# Patient Record
Sex: FEMALE | Employment: OTHER | URBAN - METROPOLITAN AREA
[De-identification: names, ages, dates, MRNs, and addresses within clinical notes are randomized per-mention and may not be internally consistent; named-entity substitution may affect disease eponyms.]

---

## 2024-08-27 ENCOUNTER — HOSPITAL ENCOUNTER (INPATIENT)
Age: 64
DRG: 949 | End: 2024-08-27
Attending: STUDENT IN AN ORGANIZED HEALTH CARE EDUCATION/TRAINING PROGRAM | Admitting: STUDENT IN AN ORGANIZED HEALTH CARE EDUCATION/TRAINING PROGRAM
Payer: COMMERCIAL

## 2024-08-27 PROBLEM — S06.5X9A TRAUMATIC SUBDURAL HEMORRHAGE WITH LOSS OF CONSCIOUSNESS OF UNSPECIFIED DURATION, INITIAL ENCOUNTER (HCC): Status: ACTIVE | Noted: 2024-08-27

## 2024-08-27 PROCEDURE — 1280000000 HC REHAB R&B

## 2024-08-27 PROCEDURE — 6370000000 HC RX 637 (ALT 250 FOR IP): Performed by: STUDENT IN AN ORGANIZED HEALTH CARE EDUCATION/TRAINING PROGRAM

## 2024-08-27 RX ORDER — ACETAMINOPHEN 325 MG/1
650 TABLET ORAL EVERY 4 HOURS PRN
Status: DISPENSED | OUTPATIENT
Start: 2024-08-27

## 2024-08-27 RX ORDER — LEVETIRACETAM 500 MG/1
1500 TABLET ORAL 2 TIMES DAILY
Status: DISCONTINUED | OUTPATIENT
Start: 2024-08-27 | End: 2024-08-30

## 2024-08-27 RX ORDER — SODIUM CHLORIDE 1 G/1
2 TABLET ORAL
Status: DISPENSED | OUTPATIENT
Start: 2024-08-28

## 2024-08-27 RX ORDER — DIPHENHYDRAMINE HYDROCHLORIDE, ZINC ACETATE 2; .1 G/100G; G/100G
CREAM TOPICAL 3 TIMES DAILY PRN
Status: DISCONTINUED | OUTPATIENT
Start: 2024-08-27 | End: 2024-08-29

## 2024-08-27 RX ORDER — TRAZODONE HYDROCHLORIDE 50 MG/1
50 TABLET, FILM COATED ORAL NIGHTLY PRN
Status: DISPENSED | OUTPATIENT
Start: 2024-08-27

## 2024-08-27 RX ORDER — SENNOSIDES A AND B 8.6 MG/1
1 TABLET, FILM COATED ORAL DAILY PRN
Status: ACTIVE | OUTPATIENT
Start: 2024-08-27

## 2024-08-27 RX ORDER — POLYETHYLENE GLYCOL 3350 17 G/17G
17 POWDER, FOR SOLUTION ORAL DAILY PRN
Status: ACTIVE | OUTPATIENT
Start: 2024-08-27

## 2024-08-27 RX ORDER — ENOXAPARIN SODIUM 100 MG/ML
40 INJECTION SUBCUTANEOUS DAILY
Status: DISPENSED | OUTPATIENT
Start: 2024-08-28

## 2024-08-27 RX ORDER — MULTIVITAMIN WITH IRON
1 TABLET ORAL DAILY
Status: DISPENSED | OUTPATIENT
Start: 2024-08-28

## 2024-08-27 RX ADMIN — DIPHENHYDRAMINE HYDROCHLORIDE, ZINC ACETATE: 2; .1 CREAM TOPICAL at 23:31

## 2024-08-27 RX ADMIN — TRAZODONE HYDROCHLORIDE 50 MG: 50 TABLET ORAL at 21:29

## 2024-08-27 RX ADMIN — ACETAMINOPHEN 650 MG: 325 TABLET ORAL at 21:28

## 2024-08-27 RX ADMIN — LEVETIRACETAM 1500 MG: 500 TABLET, FILM COATED ORAL at 21:29

## 2024-08-27 ASSESSMENT — PAIN SCALES - GENERAL
PAINLEVEL_OUTOF10: 3
PAINLEVEL_OUTOF10: 0

## 2024-08-27 ASSESSMENT — PAIN - FUNCTIONAL ASSESSMENT: PAIN_FUNCTIONAL_ASSESSMENT: ACTIVITIES ARE NOT PREVENTED

## 2024-08-27 ASSESSMENT — PAIN DESCRIPTION - ORIENTATION: ORIENTATION: ANTERIOR

## 2024-08-27 ASSESSMENT — PAIN DESCRIPTION - LOCATION: LOCATION: BACK

## 2024-08-27 ASSESSMENT — PAIN SCALES - WONG BAKER: WONGBAKER_NUMERICALRESPONSE: NO HURT

## 2024-08-28 LAB
BACTERIA URNS QL MICRO: ABNORMAL /HPF
BILIRUB UR QL STRIP.AUTO: NEGATIVE
CLARITY UR: CLEAR
COLOR UR: YELLOW
EPI CELLS #/AREA URNS AUTO: 5 /HPF (ref 0–5)
GLUCOSE UR STRIP.AUTO-MCNC: NEGATIVE MG/DL
HGB UR QL STRIP.AUTO: NEGATIVE
HYALINE CASTS #/AREA URNS AUTO: 0 /LPF (ref 0–8)
KETONES UR STRIP.AUTO-MCNC: NEGATIVE MG/DL
LEUKOCYTE ESTERASE UR QL STRIP.AUTO: ABNORMAL
NITRITE UR QL STRIP.AUTO: NEGATIVE
PH UR STRIP.AUTO: 5.5 [PH] (ref 5–8)
PROT UR STRIP.AUTO-MCNC: NEGATIVE MG/DL
RBC CLUMPS #/AREA URNS AUTO: 1 /HPF (ref 0–4)
SP GR UR STRIP.AUTO: 1.01 (ref 1–1.03)
UA COMPLETE W REFLEX CULTURE PNL UR: ABNORMAL
UA DIPSTICK W REFLEX MICRO PNL UR: YES
URN SPEC COLLECT METH UR: ABNORMAL
UROBILINOGEN UR STRIP-ACNC: 1 E.U./DL
WBC #/AREA URNS AUTO: 7 /HPF (ref 0–5)

## 2024-08-28 PROCEDURE — 97110 THERAPEUTIC EXERCISES: CPT

## 2024-08-28 PROCEDURE — 97530 THERAPEUTIC ACTIVITIES: CPT

## 2024-08-28 PROCEDURE — 97535 SELF CARE MNGMENT TRAINING: CPT

## 2024-08-28 PROCEDURE — 6360000002 HC RX W HCPCS: Performed by: STUDENT IN AN ORGANIZED HEALTH CARE EDUCATION/TRAINING PROGRAM

## 2024-08-28 PROCEDURE — 97166 OT EVAL MOD COMPLEX 45 MIN: CPT

## 2024-08-28 PROCEDURE — 92610 EVALUATE SWALLOWING FUNCTION: CPT

## 2024-08-28 PROCEDURE — 6370000000 HC RX 637 (ALT 250 FOR IP): Performed by: STUDENT IN AN ORGANIZED HEALTH CARE EDUCATION/TRAINING PROGRAM

## 2024-08-28 PROCEDURE — 97162 PT EVAL MOD COMPLEX 30 MIN: CPT

## 2024-08-28 PROCEDURE — 81001 URINALYSIS AUTO W/SCOPE: CPT

## 2024-08-28 PROCEDURE — 97116 GAIT TRAINING THERAPY: CPT

## 2024-08-28 PROCEDURE — 1280000000 HC REHAB R&B

## 2024-08-28 PROCEDURE — 92523 SPEECH SOUND LANG COMPREHEN: CPT

## 2024-08-28 RX ORDER — TRAZODONE HYDROCHLORIDE 50 MG/1
50 TABLET, FILM COATED ORAL NIGHTLY
Status: ON HOLD | COMMUNITY

## 2024-08-28 RX ADMIN — ACETAMINOPHEN 650 MG: 325 TABLET ORAL at 01:35

## 2024-08-28 RX ADMIN — DIPHENHYDRAMINE HYDROCHLORIDE, ZINC ACETATE: 2; .1 CREAM TOPICAL at 09:38

## 2024-08-28 RX ADMIN — ACETAMINOPHEN 650 MG: 325 TABLET ORAL at 14:33

## 2024-08-28 RX ADMIN — DIPHENHYDRAMINE HYDROCHLORIDE, ZINC ACETATE: 2; .1 CREAM TOPICAL at 22:27

## 2024-08-28 RX ADMIN — LEVETIRACETAM 1500 MG: 500 TABLET, FILM COATED ORAL at 20:12

## 2024-08-28 RX ADMIN — SODIUM CHLORIDE 2 G: 1 TABLET ORAL at 09:39

## 2024-08-28 RX ADMIN — SODIUM CHLORIDE 2 G: 1 TABLET ORAL at 12:27

## 2024-08-28 RX ADMIN — TRAZODONE HYDROCHLORIDE 50 MG: 50 TABLET ORAL at 22:58

## 2024-08-28 RX ADMIN — SODIUM CHLORIDE 2 G: 1 TABLET ORAL at 17:57

## 2024-08-28 RX ADMIN — ACETAMINOPHEN 650 MG: 325 TABLET ORAL at 09:46

## 2024-08-28 RX ADMIN — THERA TABS 1 TABLET: TAB at 09:40

## 2024-08-28 RX ADMIN — ENOXAPARIN SODIUM 40 MG: 100 INJECTION SUBCUTANEOUS at 12:25

## 2024-08-28 RX ADMIN — LEVETIRACETAM 1500 MG: 500 TABLET, FILM COATED ORAL at 09:39

## 2024-08-28 ASSESSMENT — PAIN SCALES - GENERAL
PAINLEVEL_OUTOF10: 3
PAINLEVEL_OUTOF10: 0
PAINLEVEL_OUTOF10: 5

## 2024-08-28 ASSESSMENT — PAIN - FUNCTIONAL ASSESSMENT: PAIN_FUNCTIONAL_ASSESSMENT: ACTIVITIES ARE NOT PREVENTED

## 2024-08-28 ASSESSMENT — PAIN DESCRIPTION - ORIENTATION: ORIENTATION: LEFT;UPPER

## 2024-08-28 ASSESSMENT — PAIN DESCRIPTION - DESCRIPTORS
DESCRIPTORS: ACHING
DESCRIPTORS: ACHING

## 2024-08-28 ASSESSMENT — PAIN DESCRIPTION - LOCATION
LOCATION: HEAD
LOCATION: HEAD

## 2024-08-28 ASSESSMENT — PAIN SCALES - WONG BAKER: WONGBAKER_NUMERICALRESPONSE: NO HURT

## 2024-08-28 NOTE — PROGRESS NOTES
Patient was admitted to room 4907 at 1930.  Patient was oriented to the Call Light, Phone, TV, Thermostat, Bed Controls, Bathroom and Emergency Cord.  Patient verbalized and demonstrated understanding of all.  Patient was also given an overview of Unit Routines for Acute Rehab, including the patient's rights and responsibilities as well as the CMS IRF DAISY Privacy Act Statement providing notice of data collection.  Patient states that their normal bowel regime is daily. Meal times were explained, including how to order food.  The white board, (which is posted on the wall by the door is used for communication) has the Therapy Scheduled that is posted each day along with the name of your doctor, nurse, and therapist for your convenience.  We recommend any family that will be care givers or any care givers the patient has, take part in therapy.  We have no set visiting hours, we suggest non-caregiver friends and family visitors come after therapy (at 4 PM or later) to allow patient to rest in between sessions.      In conjunction with the patient and patient’s family, this nurse worked to establish a tailored Fall TIPS plan to ensure patient safety and compliance:    Falls TIPS Completion    Patient identified as increased risk for harm if fall:  [x] Yes     Fall Risks  History of Falls:    [x] Yes   Medication Side Effects:   [] Yes   Walking Aid:    [] Yes   IV Pole or Equipment:   [] Yes   Unsteady Walk:     [] Yes   May Forget or Choose Not to Call: [x] Yes     Fall Interventions   Communicate Recent Fall and/or Risk of Harm: [x] Yes   Walking Aids:  Crutches: [] Yes   Cane: [] Yes   Walker: [] Yes   IV Assistance When Walking: [x] Yes   Toileting Schedule: Every 2 Hours  Bedpan:   [] Yes   Assist to Commode: [] Yes   Assist to Bathroom: [x] Yes   Bed Alarm On: [] Yes   Assistance Out of Bed:  Bedrest: [] Yes   1 Person: [x] Yes   2 People: [] Yes

## 2024-08-28 NOTE — PROGRESS NOTES
Patient transported from . Fell down 6 steps at home after drinking. Patient sustained multifocal intracranial hemorrhagic contusions predominantly involving right and left frontal lobes. Nondisplaced left occipital bone fracture extending to left occipital condyle. Scattered multifocal subarachnoid hemorrhage and a 2mm midline shift. While at  patient was in the ICU, is reported to have been in ETOH withdraw and at some point required soft restraints. Patient completed CIWA protocol prior to discharge.Since arriving at ARU patient is ambulating with a walker and gate belt. Swallows pills whole with water. Skin is intact, however patient has a rash that covers her entire back extending down to the buttocks,and the flank. Patient has urinary urgency, is alert to person, and place. Patient is pleasant but confused. Patient is highly impulsive and non compliant with use of the call light and is very forgetful.   Due to impulsivity initiated  at 2330.  Patient stated she has some burning on urination put hat in toilet.

## 2024-08-28 NOTE — PROGRESS NOTES
Nutrition Note    RECOMMENDATIONS  PO Diet: Continue the current diet   ONS: Continue the current ONS    ASSESSMENT   Admission nutrition assessment. Pt was transferred from , noted that she has been in ETOH withdraw at . Upon visiting, family reported pt has been not eating well at home, UBW is 123lb. CBW is 119 lb which is likely transfered from previous hospital record, insufficient wt data for wt hx review. Pt is currently on a regular diet; she is tolerating diet well. She also has Ensure plus HP ordered TID. No improvement of appetite at this visit. Pt took 25-50% of her breakfast today. She accepted ONS well, 100% per pt. Recommended pt to use ONS after or between meals for additional nutrition. Pt and family are understanding. Will continue to follow up.       Malnutrition Status: At risk for malnutrition (Comment)  Social/Environmental Circumstances  Findings of the 6 clinical characteristics of malnutrition:  Energy Intake:  50% or less estimated energy requirements for 1 month or longer  Weight Loss:  Unable to assess     Body Fat Loss:  No significant body fat loss     Muscle Mass Loss:  No significant muscle mass loss    Fluid Accumulation:  No significant fluid accumulation     Strength:  Not Performed      NUTRITION DIAGNOSIS   Inadequate protein-energy intake related to inadequate protein-energy intake as evidenced by poor intake prior to admission, intake 26-50%    Goals: PO intake 50% or greater, by next RD assessment     NUTRITION RELATED FINDINGS  Objective: NO edema noted. No BM noted.  Wounds: None    CURRENT NUTRITION THERAPIES  ADULT ORAL NUTRITION SUPPLEMENT; Breakfast, Lunch, Dinner; Standard High Calorie/High Protein Oral Supplement  ADULT DIET; Regular; No Caffeine     PO Intake: 26-50%   PO Supplement Intake:%      ANTHROPOMETRICS  Current Height: 160 cm (5' 2.99\")  Current Weight - Scale: 54 kg (119 lb)    Ideal Body Weight (IBW): 115 lbs  (52 kg)    Usual Bodyweight 55.8

## 2024-08-28 NOTE — PROGRESS NOTES
The pt's  in the room since 2pm.  Stated will be with the pt until 6pm.  Discussed the fall risk with the .  The  verbalized understanding.  Camera in place for monitoring.  Removed a sitter from the room while the  there.

## 2024-08-28 NOTE — PROGRESS NOTES
4 Eyes Skin Assessment     NAME:  Meche Bansal  YOB: 1960  MEDICAL RECORD NUMBER:  [id number]    The patient is being assessed for  Admission    I agree that at least one RN has performed a thorough Head to Toe Skin Assessment on the patient. ALL assessment sites listed below have been assessed.      Areas assessed by both nurses:    Head, Face, Ears, Shoulders, Back, Chest, Arms, Elbows, Hands, Sacrum. Buttock, Coccyx, Ischium, Legs. Feet and Heels, and Under Medical Devices        Does the Patient have a Wound? No noted wound(s)  Patient presented with: scattered bruising; a red rash on back, buttocks, coccyx, and trunk; and scab on left anterior abarca.       David Prevention initiated by RN: No  Wound Care Orders initiated by RN: No    Pressure Injury (Stage 3,4, Unstageable, DTI, NWPT, and Complex wounds) if present, place Wound referral order by RN under : No    New Ostomies, if present place, Ostomy referral order under : No     Nurse 1 eSignature: Electronically signed by Kenney Martin RN on 8/28/24 at 2:11 AM EDT    **SHARE this note so that the co-signing nurse can place an eSignature**    Nurse 2 eSignature: Electronically signed by Jagruti Anderson RN on 8/28/24 at 3:20 AM EDT

## 2024-08-28 NOTE — PROGRESS NOTES
[facility] - Inpatient Rehabilitation Department   Phone: [phone]    Occupational Therapy    [x] Initial Evaluation            [] Daily Treatment Note         [] Discharge Summary      Patient: Meche Bansal   : 1960   MRN: [id number]   Date of Service:  2024    Admitting Diagnosis:  Traumatic subdural hemorrhage with loss of consciousness of unspecified duration, initial encounter ([facility])  Current Admission Summary:   63 y.o. F s/p fall down 6 stairs with subsequent N/V and AMS on . ?LOC, GCS 12. Known injuries: multifocal hemorrhagic contusions (R frontal, L frontal and L cerebellar), L occipital bone fx, multifocal SDH, multifocal scattered SAH, scalp contusion, AI mild compression SEP T7; CTA head & neck: small 3x2 mm ICA aneurysm; Transferred from [facility] to [facility], HCT stable, placed on cEEG, precedex and BZD 2/2 concern for etoh withdrawal; CTL spines cleared. 8/15:1-2 seizures. : cEEG (-) for additional seizures. : rCTH w/ increase in size of L frontal contusion, additional 6hr repeat table.  HCT- stable, cEEG discontinued.  repeat CTA: concern for vasospasm, multifocal stenosis& branch occlusion of ANNALISE & R MCA. Radial A-line placed. cEEG re-ordered. Transcranial doppler US: no evidence of vasospasm. : MRI (-) for vascular distribution infarction, slight increase in vasogenic edema.   Admitted to [facility] on    Past Medical History:  has no past medical history on file.  Past Surgical History:  has a past surgical history that includes Hysterectomy and Dilatation, esophagus.    Discharge Recommendations: Pending progress, anticipate Home with HHOT and 24 hour supervision/assist     DME Required For Discharge: DME to be determined pending patient progress    Precautions/Restrictions: high fall risk  Weight Bearing Restrictions: no restrictions   Required Braces/Orthotics: no braces required  Positional Restrictions:Spinal Precautions - No Bending,  Lifting, Twisting no lifting >8 lbs     Pre-Admission Information   Lives With: spouse, dog (Adenike).  (Fausto)- works full time but reports he may retire soon   Type of Home: house  Home Layout: two level, 6 + 6  stairs to 2nd level with R and L HR  Home Access:  2 step to enter without rails   Bathroom Layout: walk in shower  Bathroom Equipment:  no equipment   Toilet Height: standard height  Home Equipment: no prior equipment  Transfer Assistance: Independent without use of device  Ambulation Assistance:Independent without use of device  ADL Assistance: independent with all ADL's  IADL Assistance: independent with homemaking tasks  Active :        [x] Yes  [] No  Hand Dominance: [] Left  [x] Right  Current Employment: retired.  Occupation:    Hobbies: Vessel, [employer], [employer]   Recent Falls: only 1 fall that led to this admission   Available Assistance at Discharge:  to be determined based off husbands work     Examination   Vision:   Vision Corrective Device: wears glasses for reading no issues during visual tracking   Denies vision changes but reports some burning.   Hearing:   WFL  Perception:   WFL  Observation:   General Observation:  small scrape on L shin, scattered bruising on arms, rash covering entire back   Posture:   WFL  Sensation:   denies numbness and tingling  Proprioception:    WFL  Tone:   Normotonic  Coordination Testing:   WFL  Finger/Thumb Opposition: WFL    ROM:   (B) UE AROM WFL  Strength:   (B) UE strength grossly 5    Therapist Clinical Decision Making (Complexity): medium complexity  Clinical Presentation: stable      Subjective  General: Patient asleep in bed upon arrival, agreeable to OT evaluation. Increased time and encouragement initially to get up due to not sleeping well last night and confusion. Patient initially thinking she was at home. Reorientation, comfort and reassurance provided throughout.    arrived at beginning of session. Patient reporting  back itching but feels better with cream. Nursing in to look rash on back after shower.  Pain: 0/10  Pain Interventions: not applicable        Activities of Daily Living  Basic Activities of Daily Living  Feeding: setup assistance  Feeding Comments: set up with breakfast at end of session   Grooming: contact guard assistance  Grooming Comments: completed oral hygiene standing at sink with CGA   Upper Extremity Bathing: stand by assistance requires verbal cueing  Lower Extremity Bathing: moderate assistance   Bathing Comments: shower completed seated on TTB in walk in shower. Cues for thoroughness of UB bathing. 1 LOB while attempting to stand- Napoleon to correct. Education provided to remain seated. Assist for washing lower legs and drying rear periarea while in stance with grab bar   Upper Extremity Dressing: minimal assistance  Lower Extremity Dressing: minimal assistance maximum assistance  Dressing Comments: dressing completed on TTB. Napoleon donning bra. Napoleon threading pullup, maxA donning socks. CGA while in stance to manage over hips in stance   Toileting: minimal assistance.    Toileting Comments: Napoleon clothing mgmt. Patient voided urine, able to complete pericare. Patient incontinent of urine prior to session.    General Comments: Patient tolerated ADLs well. Increased time and encouragement initially to take shower. Education and cues provided to maintain spinal precautions during LB ADLs. Plan to confirm with MD if precautions are still in place.     Instrumental Activities of Daily Living  No IADL completed on this date.    Functional Mobility  Bed Mobility:  Supine to Sit: minimal assistance  Scooting: contact guard assistance  Comments:  Transfers:  Sit to stand transfer:minimal assistance  Stand to sit transfer: minimal assistance  Toilet transfer: minimal assistance  Toilet transfer equipment: standard toilet, grab bars  Shower transfer: minimal assistance  Shower transfer equipment: tub transfer bench,

## 2024-08-28 NOTE — H&P
Patient: Meche Bansal  [id number]  Date: 8/28/2024      Chief Complaint: TBI    History of Present Illness/Hospital Course:  Meche Bansal is a 63 y.o. female with PMHx notable for alcohol use disorder who presented to [facility] on 8/13 after fall down approximately 6 stairs. + LOC. + ETOH. GCS 11 on arrival. CT head demonstrated hemorrhagic contusions involving the bilateral frontal lobes (right greater than left) and left cerebellar hemisphere, right sided SDH with 2 mm leftward midline shift, multifocal traumatic subarachnoid hemorrhage, nondisplaced left occipital bone fracture extending into left occipital condyle.  CTA revealed grade 1 BCVI involving the distal ICA bilaterally, and a small 3 x 2 mm left ICA aneurysm.  She was transferred to [facility].  She was also found to have an age-indeterminate T7 compression fracture for which Ortho Spine was consulted, but did not recommend any acute surgical intervention. She was monitored on continuous EEG, which was significant for 1 definite and multiple possible focal electrographic seizures.  She failed swallow study and had NG tube placed for enteral nutrition.  She required multiple hypertonic saline boluses for hyponatremia, as well as electrolyte repletion for refeeding syndrome.  Her swallow function eventually improved and she is now tolerating PO diet. Hospital course was otherwise complicated by agitation requiring Precedex and intermittent use of nonviolent restraints.    Patient reports that she is doing well currently.  She reports some mild left-sided headache pain.  She denies any vision changes, hearing loss, speech difficulties, swallowing dysfunction, focal numbness or weakness.  She complains of a pruritic rash involving her entire back.    Prior Level of Function:  Independent for mobility, self-care, IADLs.  Lives with spouse in a multilevel house with 2 steps to enter.    Current Level of Function:  Max assist for feeding, min assist for grooming, max

## 2024-08-28 NOTE — PROGRESS NOTES
[facility] - Inpatient Rehabilitation Department   Phone: [phone]    Speech Therapy    [x] Initial Evaluation            [] Daily Treatment Note         [] Discharge Summary      Patient: Meche Bansal   : 1960   MRN: [id number]   Date of Service:  2024  Admitting Diagnosis: Traumatic subdural hemorrhage with loss of consciousness of unspecified duration, initial encounter ([facility])  Current Admission Summary: See MD report   Past Medical History:  has no past medical history on file.  Past Surgical History:  has a past surgical history that includes Hysterectomy and Dilatation, esophagus.  Instrumental Swallow Study: FEES 2024  Assessment: Patient presents with oral dysphagia secondary to  absent mastication , complicated by cognitive status resulting in unchewed soft solid swallowed whole. Patient's pharyngeal swallow function is WFL as no penetration or aspiration of any consistency was observed. Swallow initiation is timely and no significant post-swallow pharyngeal residue is noted. Of note, patient observed with intermittent cough throughout assessment that occurred in the absence of penetration or aspiration. Patient also with poor scope tolerance. RN reports that patient has been demonstrating fluctuating levels of alertness since admission. Based on today's evaluation, recommend a Puree diet (IDDSI 4) with Thin liquids (IDDSI 0); meds crushed in puree; 1:1 feeding assist, hold tray if patient is not alert. Patient is at an increased risk of aspiration.   Precautions/Restrictions: high fall risk        Pre-Admission Information   Living Status: Pt lives with her , Fausto.   Occupation/School: Pt helps to take care of her grandchildren.   Medication Management: :  [x]Primary   []Secondary []No  Finance Management: [x]Primary   []Secondary []No  Active :   [x]Yes         []No  Hearing:    WFL  Vision:    Vision Gross Assessment: WFL      Subjective  General: Pt  pleasantly confused and fidgeting. Required frequent redirections and reminders of orientation information. Her spouse was present to provide/confirm personal history information.   Pain: Denies    Safety Interventions: family/caregiver present - RN and spouse present to assist pt to bathroom.       Dysphagia Bedside Swallow Evaluation     Prior Dysphagia History: No known dysphagia prior to this admission.   Patient Complaint: Pt denied symptoms associated with dysphagia - coughing, globus sensation   Patient Positioning:   Upright in chair  Respiratory Status:   Room air  Pre-Evaluation Consistency Recommendation:   Regular texture diet  with Thin liquids    Dentition:   Adequate dentition   Oral Hygiene:   Moist   Clean   Baseline Vocal Quality:   normal   Volitional Cough:   adequate   Volitional Swallow:   Adequate   Oral Mechanism Exam:   Impairment Severity:Within functional limits      Oral Phase:   Impairment Severity:Within functional limits      Pharyngeal Phase:   Impairment Severity:Within functional limits     Dysphagia risk factors:   impaired cognition  Risk factors for developing adverse outcomes to aspiration:   N/a   Eating Assistance:   Setup or clean-up assistance       Clinical Dysphagia Assessment:   Dysphagia assessment was limited based upon pt's acceptance of food. However, swallow mechanism appeared grossly intact. Oral motor exam revealed functional lingual, labial, and buccal ROM and coordination. Dentition was adequate for mastication. Laryngeal function assessment revealed present volitional swallow and clear vocal quality.   Pt assessed with: thin liquid via cup and straw and limited bites of regular/soft solids. The oral phase characterized by adequate labial seal, functional oral manipulation and mastication. The pharyngeal phase appeared grossly intact for the limited trials that were assessed. Pt without clinical s/s oropharyngeal dysphagia at bedside or significant risk factors  for graded complex information to 80%, for improved recall and retention of newly learned information   Pt will complete word associative tasks to improve mental flexibility, complex thinking, and working memory skills with 80% accuracy.    Patient will be instructed in memory strategies to utilize in order to promote recall of newly learned information with >80% min cues.    Above goals reviewed on 8/28/2024. All goals are ongoing at this time unless indicated above.       Therapy Session Time      Session 1 Session 2   Time In 0830    Time Out 0930    Time Code Minutes 0    Individual Minutes 60      Timed Code Treatment Minutes:  0  Total Treatment Minutes:  60    Electronically Signed By:   GISELLA Rhodes,  Clinician  Speech Language Pathology    Speech Language Pathologist observed and directed patient's plan of care. Co-signed and supervised by:   Jessica Barr M.A. [facility] S.PMelvina [id number]  Speech-Language Pathologist   8/28/2024 10:16 AM

## 2024-08-28 NOTE — PLAN OF CARE
Physician anticipated functional outcomes:  By discharge, the patient will progress to being independent with all mobility and activities.   IPOC brief synthesis: 63 y.o. female with PMHx notable for alcohol use disorder who presented to [facility] on 8/13 after fall down approximately 6 stairs. + LOC. + ETOH. GCS 11 on arrival. CT head demonstrated hemorrhagic contusions involving the bilateral frontal lobes (right greater than left) and left cerebellar hemisphere, right sided SDH with 2 mm leftward midline shift, multifocal traumatic subarachnoid hemorrhage, nondisplaced left occipital bone fracture extending into left occipital condyle.  CTA revealed grade 1 BCVI involving the distal ICA bilaterally, and a small 3 x 2 mm left ICA aneurysm.  She was transferred to [facility].  She was also found to have an age-indeterminate T7 compression fracture for which Ortho Spine was consulted, but did not recommend any acute surgical intervention. She was monitored on continuous EEG, which was significant for 1 definite and multiple possible focal electrographic seizures.  She failed swallow study and had NG tube placed for enteral nutrition.  She required multiple hypertonic saline boluses for hyponatremia, as well as electrolyte repletion for refeeding syndrome.  Her swallow function eventually improved and she is now tolerating PO diet. Hospital course was otherwise complicated by agitation requiring Precedex and intermittent use of nonviolent restraints.     I have reviewed this initial plan of care and agree with its contents:    Title   Name    Date    Time    Physician: Panfilo Pond MD 08/29/24 6:48 PM      Case Mgmt:  Carlton Shrestah [facility]  08/28/24  1442    OT: LOIS Junior OTR/L 8/28/24 1509     PT: Jelani Saleem PT, DPT - [id number], 8/28/2024 3:33 PM    RN: Kenney Martin RN 8/28/24 0434    ST: Mingo Waller MA, CCC-SLP 8/29/24 1057    :  Emmanuel Pierre PT, DPT [id number]   8/29/24  8:35 AM

## 2024-08-28 NOTE — PATIENT CARE CONFERENCE
[facility] Inpatient Rehabilitation Department  Weekly Team Conference Note    Patient Name: Meche Bansal      MRN: [id number]  : 1960  (63 y.o.) Gender: female     The team conference for this patient was held on 24 at 1100 am and was led by:  Panfilo Pond MD     CASE MANAGEMENT:  Assessment: Patient is from home with spouse admitted to ARU with a subdermal hematoma. Current recommendation is for patient to discharge home w/HHC and 24 hour supervision. Spouse works full time and doesn't think that he and family will be able to provide this type of care.  SW discussed adult day care options and provided spouse a list of open facilities in this area.  Discussed the [facility] [employer] [facility] and how they can sometimes assist with payment for these services.  Spouse was agreeable to a referral to their Fast Track Program.  Spouse also agreeable to HHC.  's insurance plan has a very limited amount of providers that accept this plan.  The only provider known at this time is [facility].  KELLY contacted Ejfe at this agency, [phone], who stated hat we must fax orders and information to [employer] first before the insurance company will approve for them to start services.  KELLY called [phone] and spoke with a rep who stated that the authorization when approved is only good for 5 days.  Rep suggested to fax in everything next Tuesday so the auth will be good when patient discharges.    PHYSICAL THERAPY    Current Status:  Bed Mobility:  Supine to Sit: stand by assistance  Sit to Supine: stand by assistance  Rolling Left: stand by assistance  Rolling Right: stand by assistance  Scooting: stand by assistance  Comments: Completed within therapy room - HOB flat and no use of bed rails   Transfers:  Sit to stand transfer: contact guard assistance  Stand to sit transfer: contact guard assistance  Car transfer: minimal assistance, (poor safety awareness with irregular hand placement and  made as no follow up visit occurring.  Patient has significant cognitive deficits limiting safety awareness within mobility in addition to observed balance/gait deficits.  Patient completes all mobility without device on evaluation. Severe cognitive-linguistic deficits on eval are characterized by impaired orientation, problem solving and insight with most significant impairments being attention and memory recall. Patient requiring assistance for ADLs due to impaired cognition and decreased dynamic balance.   Factors facilitating achievement of goals:  PLOF, family support  Barriers to achievement of goals:  Severe cognitive deficits, impaired gait, impaired balance, impaired safety   Interventions to address Barriers:  Gait training, dynamic balance training, dual task mobility, global strengthening program.Cognitive training tasks, compensatory memory strategies. OT interventions focusing on improving dynamic balance and safety during ADLs/IADLs.   Goals still appropriate:  Yes  Modifications to goals: None  Continue Current Plan of Care:  Yes  Modifications to Plan of Care: None    Rehab Team Members in attendance for Team Conference:  Carlton Shrestha, MSW, LSW    Nicci Fabian, RD, LD    Bethany Godinez, OTR/L    Jelani Saleem, PT, DPT    Mingo Waller M.A., CCC-SLP    CHACHO Bowers, RN  (Charge RN)    Emmanuel Pierre PT, DPT,     Scribe:  Emmanuel Pierre I, the Rehab Physician, led the above team conference and agree with all decisions made, and approve the established interdisciplinary plan of care as documented within the medical record of Meche Bansal.    Panfilo Pond MD

## 2024-08-28 NOTE — PROGRESS NOTES
Admission Period/Goal QM Codes for Meche Bansal.    QM Admit Code Goal Code   Eating 5 6   Oral Hygiene 4 6   Toileting Hygiene 3 6   Shower/Bathing 3 6   UB Dressing 3 6   LB Dressing 3 6   Putting on/off Footwear 2 6   Rolling Left and Right 4 6   Sit To Lying 4 6   Lying to Sitting on Bedside 4 6   Sit to Stand 4 6   Chair/Bed to Chair Transfer 3 6   Toilet Transfers 3 6   Car Transfers 3 6   Walk 10 Feet 3 6   Walk 50 Feet with Two Turns 3 6   Walk 150 Feet 3 6   Walk 10 Feet on Uneven Surfaces 3 6   1 Step (Curb) 3 6   4 Steps 3 6   12 Steps 88 6   Picking up Object from Floor 88 6   Wheel 50 Feet with 2 Turns     Type     Wheel 150 Feet     Type         The above codes were determined by the treatment team to be the patient's accurate admission assessment codes based on assessment performed soon after the patient's admission and prior to the benefit of services provided by staff, or if appropriate, the patient's usual performance at admission.    OT:       PT:  Alessia Anderson, GAYATHRI [id number], 8/30/24, 1147     RN:       ST:  Jessica Barr MA CCC-SLP 8/30/2024 [id number]      :

## 2024-08-28 NOTE — PROGRESS NOTES
[facility] - Inpatient Rehabilitation Department   Phone: [phone]    Physical Therapy    [x] Initial Evaluation            [] Daily Treatment Note         [] Discharge Summary      Patient: Meche Bansal   : 1960   MRN: [id number]   Date of Service:  2024  Admitting Diagnosis: Traumatic subdural hemorrhage with loss of consciousness of unspecified duration, initial encounter ([facility])    Current Admission Summary: Meche Bansal is a 63 y.o. female with PMHx notable for alcohol use disorder who presented to [facility] on  after fall down approximately 6 stairs. + LOC. + ETOH.  CT head demonstrated hemorrhagic contusions involving the bilateral frontal lobes (right greater than left) and left cerebellar hemisphere, right sided SDH with 2 mm leftward midline shift, multifocal traumatic subarachnoid hemorrhage, nondisplaced left occipital bone fracture extending into left occipital condyle.  CTA revealed grade 1B CVI involving the distal ICA bilaterally, and a small 3 x 2 mm left ICA aneurysm.  She was [facility] to [facility].  She was monitored on continuous EEG, which was significant for 1 definite and multiple possible focal electrographic seizures.     Past Medical History:  has no past medical history on file.  Past Surgical History:  has a past surgical history that includes Hysterectomy and Dilatation, esophagus.    Discharge Recommendations: HHPT with 24 hr supervision  DME Required For Discharge: DME to be determined pending patient progress  Precautions/Restrictions: high fall risk  Weight Bearing Restrictions: no restrictions     Required Braces/Orthotics: no braces required  Positional Restrictions:Sternal Precautions - No Pushing, Pulling, Lifting > 8 lbs    Pre-Admission Information     Lives With: spouse, dog (Adenike).  (Fausto)- works full time but reports he may retire soon   Type of Home: house  Home Layout: two level, 6 + 6  stairs to 2nd level with R and L HR  Home Access:  2  by assistance  Scooting: stand by assistance  Comments: Completed within therapy room - HOB flat and no use of bed rails   Transfers:  Sit to stand transfer: contact guard assistance  Stand to sit transfer: contact guard assistance  Car transfer: minimal assistance, (poor safety awareness with irregular hand placement and sequence)  Comments: All transfers completed without an AD. Multiple sit <=> stand transfers completed within session at various heights and surfaces.   Ambulation:  Surface:level surface  Assistive Device: no device  Assistance: minimal assistance  Distance: 190' + 100' x 2 completions + multiple short distances within session.   Gait Mechanics: mild/moderate path deviation, narrow CATE, equal step length/height  Comments: Patient continually reaching for surfaces during ambulation.   Ambulation Trial 2  Surface:carpet  Assistive Device: no device  Assistance: minimal assistance  Distance: 20'  Gait Mechanics: Unchanged from level surface  Comments:    Stair Mobility:  Number of Steps: 4  Step Height: 6 inch  Hand Rails: Attempted unilateral HR.  Unwilling to release from 2nd HR despite max VC/TC  Assistance: minimal assistance  Comments: Reciprocal pattern with heavy reliance on (B) UE support.   Stair Mobility Trial 2  Number of Steps: 1 step x 3 completions  Step Height: 4 inch  Hand Rails: None  Assistance: minimal assistance  Comments: Attempts to reach for surrounding people and surfaces to stability during task completion  Balance:  Static Sitting Balance: fair (+): maintains balance at SBA/supervision without use of UE support  Dynamic Sitting Balance: fair (+): maintains balance at SBA/supervision without use of UE support  Static Standing Balance: fair (-): maintains balance at CGA with use of UE support  Dynamic Standing Balance: poor (+): requires min (A) to maintain balance  Comments:    Other Therapeutic Interventions     See functional mobility above.  Ambulation x 3 minutes within

## 2024-08-28 NOTE — PROGRESS NOTES
ARU Admission Assessment    Ethnicity  \"Are you of , /a, or Jamaican origin?\"  Check all that apply:  [x] A.  No, not of , /a, or Jamaican Origin  [] B.  Yes, Cymro, Cymro American, Chicano/a  [] C.  Yes, Japanese  [] D.  Yes, Jeremi  [] E.  Yes, another , , or Jamaican origin  [] X.  Patient unable to respond  [] Y.  Patient declines to respond    Race  \"What is your race?\"  Check all that apply:  [x] A.  White  [] B.  Black or   [] C.   or   [] D.   Surinamese  [] E.  Chinese  [] F.  Surinamese  [] G. Malaysian  [] H.  Khmer  [] I.  Turkish  [] J.  Other   [] K.    [] L.  Comoran or Emanuel  [] M.  Bermudian  [] N.  Other   [] X.  Patient unable to respond  [] Y.  Patient declines to respond  [] Z.  None of the above    Language  A.  \"What is your preferred language?\"   English    B.  \"Do you need or want an  to communicate with a doctor or health care staff?\"  Check only one:  [x] 0.  No  [] 1.  Yes  [] 9.  Unable to determine    Transportation  \"Has lack of transportation kept you from medical appointments, meetings, work, or from getting things needed for daily living?\"Check all that apply:  [] A.  Yes, it has kept me from medical appointments or from getting my medications  [] B.  Yes, it has kept me from non-medical meetings, appointments, work, or from getting things that I need  [x] C.  No  [] X.  Patient unable to respond  [] Y.  Patient declines to respond    Hearing  Ability to hear (with hearing aid or hearing appliances if normally used)  [x]  0.  Adequate - no difficulty in normal conversation, social interaction, listening to TV  []  1.  Minimal difficulty - difficulty in some environments (e.g. when person speaks softly or setting is noisy)  []  2.  Moderate difficulty - speaker has to increase volume and speak distinctly   []  3.  Highly impaired - absence of  of the above    High Risk Drug Classes:  Use and Indication    Is taking: Check if the pt is taking any medications by pharmacological classification, not how it is used, in the following classes  Indication noted: If column 1 is checked, check if there is an indication noted for all meds in the drug class Is taking  (check all that apply) Indication noted (check all that apply)   Antipsychotic [] []   Anticoagulant [x] [x]   Antibiotic [] []   Opioid [] []   Antiplatelet [] []   Hypoglycemic (including insulin) [] []   None of the above []     Special Treatments, Procedures, and Programs    Check all of the following treatments, procedures, and programs that apply on admission. On admission (check all that apply)   Cancer Treatments   A1. Chemotherapy []           A2. IV []           A3. Oral []           A10. Other []   B1. Radiation []   Respiratory Therapies   C1. Oxygen Therapy []           C2. Continuous (continuously for at least 14 hours per day) []           C3. Intermittent []           C4. High-concentration []   D1. Suctioning (Does not include oral suctioning) []           D2. Scheduled []           D3. As needed []   E1. Tracheostomy Care []   F1. Invasive Mechanical Ventilator (ventilator or respirator) []   G1. Non-invasive Mechanical Ventilator []           G2. BiPAP []           G3. CPAP []   Other   H1. IV Medications (Do not include sub Q pumps, flushes, Dextrose 50% or lactated ringers) []           H2. Vasoactive medications []           H3. Antibiotics []           H4. Anticoagulation []           H10. Other []   I1. Transfusions []   J1. Dialysis []           J2. Hemodialysis []           J3. Peritoneal dialysis []   O1. IV access (including a catheter in a vein) []           O2. Peripheral []           O3. Midline []           O4. Central (PICC, tunneled, port) []      None of the above (select if no Cancer, Respiratory, or Other boxes are checked) [x]     The above items have been reviewed

## 2024-08-28 NOTE — CARE COORDINATION
Case Management Assessment  Initial Evaluation    Date/Time of Evaluation: 8/28/2024 5:37 PM  Assessment Completed by: ANAHI Zabala, OLIVIERW    If patient is discharged prior to next notation, then this note serves as note for discharge by case management.    Patient Name: Meche Bansal                   YOB: 1960  Diagnosis: Traumatic subdural hemorrhage (HCC) [S06.5XAA]  Traumatic subdural hemorrhage with loss of consciousness of unspecified duration, initial encounter (HCC) [S06.5X9A]                   Date / Time: 8/27/2024  8:11 PM    Patient Admission Status: REHAB IP   Readmission Risk (Low < 19, Mod (19-27), High > 27): Readmission Risk Score: 5.1    Current PCP: No primary care provider on file.  PCP verified by CM? Yes (Has PCP at )    Chart Reviewed: Yes      History Provided by: Other (see comment) (chart review)  Patient Orientation: Alert and Oriented    Patient Cognition: Alert    Hospitalization in the last 30 days (Readmission):  No    If yes, Readmission Assessment in CM Navigator will be completed.    Advance Directives:      Code Status: Full Code   Patient's Primary Decision Maker is:      Primary Decision Maker: Anthony Bansal - Spouse - [phone]    Discharge Planning:    Patient lives with: Spouse/Significant Other Type of Home: House (2 level - 2 steps in)  Primary Care Giver: Self  Patient Support Systems include: Spouse/Significant Other, Family Members   Current Financial resources: None  Current community resources: None  Current services prior to admission: None            Current DME:              Type of Home Care services:  OT, PT, Nursing Services    ADLS  Prior functional level: Independent in ADLs/IADLs  Current functional level: Mobility (current recommendation is home w/HHC and 24hr supv)    PT AM-PAC:   /24  OT AM-PAC:   /24    Family can provide assistance at DC: Yes  Would you like Case Management to discuss the discharge plan with any other family  members/significant others, and if so, who? Yes (w/spouse)  Plans to Return to Present Housing: Yes  Other Identified Issues/Barriers to RETURNING to current housing: None  Potential Assistance needed at discharge: Home Care            Potential DME:    Patient expects to discharge to: House  Plan for transportation at discharge: Family    Financial    Payor: [employer] / Plan: [employer] / Product Type: *No Product type* /     Does insurance require precert for SNF: Yes    Potential assistance Purchasing Medications: No  Meds-to-Beds request:        [facility] [id number] - [place], [place] - [address] [phone] - F [phone]  [address] [address]  Phone: [phone] Fax: [phone]      Notes:    Factors facilitating achievement of predicted outcomes: Family support and Cooperative    Barriers to discharge: None    Additional Case Management Notes:  Patient is from home with spouse admitted to ARU with a subdermal hematoma.  Current recommendation is for patient to discharge home w/[facility] and 24 hour supervision.  Spouse works full time but stated he may be retiring soon.  Team conference is scheduled for tomorrow to further discuss this.    The Plan for Transition of Care is related to the following treatment goals of Traumatic subdural hemorrhage (HCC) [S06.5XAA]  Traumatic subdural hemorrhage with loss of consciousness of unspecified duration, initial encounter (HCC) [S06.5X9A]    IF APPLICABLE: The Patient and/or patient representative Meche and her family were provided with a choice of provider and agrees with the discharge plan. Freedom of choice list with basic dialogue that supports the patient's individualized plan of care/goals and shares the quality data associated with the providers was provided to:     Patient Representative Name:       The Patient and/or Patient Representative Agree with the Discharge Plan?      ANAHI Zabala,

## 2024-08-29 LAB
25(OH)D3 SERPL-MCNC: 53.1 NG/ML
ANION GAP SERPL CALCULATED.3IONS-SCNC: 13 MMOL/L (ref 3–16)
BASOPHILS # BLD: 0.1 K/UL (ref 0–0.2)
BASOPHILS NFR BLD: 1 %
BUN SERPL-MCNC: 9 MG/DL (ref 7–20)
CALCIUM SERPL-MCNC: 9.2 MG/DL (ref 8.3–10.6)
CHLORIDE SERPL-SCNC: 103 MMOL/L (ref 99–110)
CO2 SERPL-SCNC: 21 MMOL/L (ref 21–32)
CREAT SERPL-MCNC: <0.5 MG/DL (ref 0.6–1.2)
DEPRECATED RDW RBC AUTO: 12.9 % (ref 12.4–15.4)
EOSINOPHIL # BLD: 0.1 K/UL (ref 0–0.6)
EOSINOPHIL NFR BLD: 1.1 %
FOLATE SERPL-MCNC: 27.5 NG/ML (ref 4.78–24.2)
GFR SERPLBLD CREATININE-BSD FMLA CKD-EPI: >90 ML/MIN/{1.73_M2}
GLUCOSE SERPL-MCNC: 98 MG/DL (ref 70–99)
HCT VFR BLD AUTO: 32 % (ref 36–48)
HGB BLD-MCNC: 11 G/DL (ref 12–16)
LYMPHOCYTES # BLD: 1.2 K/UL (ref 1–5.1)
LYMPHOCYTES NFR BLD: 18.6 %
MCH RBC QN AUTO: 31.8 PG (ref 26–34)
MCHC RBC AUTO-ENTMCNC: 34.3 G/DL (ref 31–36)
MCV RBC AUTO: 92.8 FL (ref 80–100)
MONOCYTES # BLD: 0.7 K/UL (ref 0–1.3)
MONOCYTES NFR BLD: 11.4 %
NEUTROPHILS # BLD: 4.4 K/UL (ref 1.7–7.7)
NEUTROPHILS NFR BLD: 67.9 %
PLATELET # BLD AUTO: 376 K/UL (ref 135–450)
PMV BLD AUTO: 8.2 FL (ref 5–10.5)
POTASSIUM SERPL-SCNC: 3.7 MMOL/L (ref 3.5–5.1)
RBC # BLD AUTO: 3.45 M/UL (ref 4–5.2)
SODIUM SERPL-SCNC: 137 MMOL/L (ref 136–145)
VIT B12 SERPL-MCNC: 483 PG/ML (ref 211–911)
WBC # BLD AUTO: 6.5 K/UL (ref 4–11)

## 2024-08-29 PROCEDURE — 1280000000 HC REHAB R&B

## 2024-08-29 PROCEDURE — 97530 THERAPEUTIC ACTIVITIES: CPT

## 2024-08-29 PROCEDURE — 6360000002 HC RX W HCPCS: Performed by: STUDENT IN AN ORGANIZED HEALTH CARE EDUCATION/TRAINING PROGRAM

## 2024-08-29 PROCEDURE — 97116 GAIT TRAINING THERAPY: CPT

## 2024-08-29 PROCEDURE — 97110 THERAPEUTIC EXERCISES: CPT

## 2024-08-29 PROCEDURE — 82607 VITAMIN B-12: CPT

## 2024-08-29 PROCEDURE — 80048 BASIC METABOLIC PNL TOTAL CA: CPT

## 2024-08-29 PROCEDURE — 82746 ASSAY OF FOLIC ACID SERUM: CPT

## 2024-08-29 PROCEDURE — 85025 COMPLETE CBC W/AUTO DIFF WBC: CPT

## 2024-08-29 PROCEDURE — 36415 COLL VENOUS BLD VENIPUNCTURE: CPT

## 2024-08-29 PROCEDURE — 97535 SELF CARE MNGMENT TRAINING: CPT

## 2024-08-29 PROCEDURE — 97129 THER IVNTJ 1ST 15 MIN: CPT

## 2024-08-29 PROCEDURE — 6370000000 HC RX 637 (ALT 250 FOR IP): Performed by: STUDENT IN AN ORGANIZED HEALTH CARE EDUCATION/TRAINING PROGRAM

## 2024-08-29 PROCEDURE — 97130 THER IVNTJ EA ADDL 15 MIN: CPT

## 2024-08-29 PROCEDURE — 82306 VITAMIN D 25 HYDROXY: CPT

## 2024-08-29 PROCEDURE — 97112 NEUROMUSCULAR REEDUCATION: CPT

## 2024-08-29 RX ORDER — BENZOCAINE/MENTHOL 6 MG-10 MG
LOZENGE MUCOUS MEMBRANE 2 TIMES DAILY
Status: DISPENSED | OUTPATIENT
Start: 2024-08-29 | End: 2024-09-03

## 2024-08-29 RX ORDER — HYDROXYZINE HYDROCHLORIDE 10 MG/1
10 TABLET, FILM COATED ORAL 3 TIMES DAILY PRN
Status: DISCONTINUED | OUTPATIENT
Start: 2024-08-29 | End: 2024-09-01

## 2024-08-29 RX ORDER — PROPRANOLOL HYDROCHLORIDE 20 MG/1
10 TABLET ORAL 3 TIMES DAILY
Status: DISPENSED | OUTPATIENT
Start: 2024-08-29

## 2024-08-29 RX ADMIN — SODIUM CHLORIDE 2 G: 1 TABLET ORAL at 12:43

## 2024-08-29 RX ADMIN — ACETAMINOPHEN 650 MG: 325 TABLET ORAL at 09:13

## 2024-08-29 RX ADMIN — DIPHENHYDRAMINE HYDROCHLORIDE, ZINC ACETATE: 2; .1 CREAM TOPICAL at 06:55

## 2024-08-29 RX ADMIN — LEVETIRACETAM 1500 MG: 500 TABLET, FILM COATED ORAL at 20:17

## 2024-08-29 RX ADMIN — HYDROCORTISONE: 0.01 CREAM TOPICAL at 20:17

## 2024-08-29 RX ADMIN — TRAZODONE HYDROCHLORIDE 50 MG: 50 TABLET ORAL at 20:17

## 2024-08-29 RX ADMIN — ENOXAPARIN SODIUM 40 MG: 100 INJECTION SUBCUTANEOUS at 09:14

## 2024-08-29 RX ADMIN — PROPRANOLOL HYDROCHLORIDE 10 MG: 20 TABLET ORAL at 14:02

## 2024-08-29 RX ADMIN — ACETAMINOPHEN 650 MG: 325 TABLET ORAL at 16:12

## 2024-08-29 RX ADMIN — SODIUM CHLORIDE 2 G: 1 TABLET ORAL at 16:12

## 2024-08-29 RX ADMIN — HYDROXYZINE HYDROCHLORIDE 10 MG: 10 TABLET ORAL at 16:12

## 2024-08-29 RX ADMIN — SODIUM CHLORIDE 2 G: 1 TABLET ORAL at 09:14

## 2024-08-29 RX ADMIN — LEVETIRACETAM 1500 MG: 500 TABLET, FILM COATED ORAL at 09:13

## 2024-08-29 RX ADMIN — HYDROCORTISONE: 0.01 CREAM TOPICAL at 14:02

## 2024-08-29 RX ADMIN — THERA TABS 1 TABLET: TAB at 09:13

## 2024-08-29 RX ADMIN — PROPRANOLOL HYDROCHLORIDE 10 MG: 20 TABLET ORAL at 20:17

## 2024-08-29 ASSESSMENT — PAIN DESCRIPTION - ORIENTATION
ORIENTATION: DISTAL
ORIENTATION: LEFT;ANTERIOR

## 2024-08-29 ASSESSMENT — PAIN SCALES - GENERAL
PAINLEVEL_OUTOF10: 8
PAINLEVEL_OUTOF10: 0
PAINLEVEL_OUTOF10: 8

## 2024-08-29 ASSESSMENT — PAIN SCALES - WONG BAKER
WONGBAKER_NUMERICALRESPONSE: NO HURT
WONGBAKER_NUMERICALRESPONSE: NO HURT

## 2024-08-29 ASSESSMENT — PAIN DESCRIPTION - DESCRIPTORS
DESCRIPTORS: ACHING
DESCRIPTORS: ACHING

## 2024-08-29 ASSESSMENT — PAIN DESCRIPTION - LOCATION
LOCATION: HEAD
LOCATION: HEAD

## 2024-08-29 NOTE — PROGRESS NOTES
Meche Bansal  8/29/2024  [id number]    Chief Complaint: Traumatic subdural hemorrhage with loss of consciousness of unspecified duration, initial encounter ([facility])    Subjective:   No acute events overnight.    She continues to be impulsive, restless. She continues to have diffuse pruritic rash on her back, minimally improved with topical Benadryl.  Appetite is adequate. Denies fevers/chills, chest pain, dyspnea, abdominal pain.     Last BM: Stool Occurrence: 1 (08/29/24 1808)    Objective:  Patient Vitals for the past 24 hrs:   BP Temp Temp src Pulse Resp SpO2   08/29/24 0745 (!) 143/91 99.4 °F (37.4 °C) Oral -- 18 98 %   08/28/24 1919 123/65 97.9 °F (36.6 °C) Oral (!) 103 20 99 %     Gen: No distress, pleasant.   HEENT: Normocephalic, atraumatic.   CV: Regular rate and rhythm. Extremities warm, well perfused.   Resp: No respiratory distress. CTAB.  Abd: Soft, nontender  Ext: No edema.   Skin: Diffuse papular rash w/ excoriations involving back.   Neuro: Alert, oriented to person and place. Restless.     Laboratory data: Available via EMR.     Therapy progress:       PT    Supine to Sit: Supervision or touching assistance  Sit to Supine: Supervision or touching assistance   Sit to Stand: Supervision or touching assistance  Chair/Bed to Chair Transfer: Partial/moderate assistance  Car Transfer: Partial/moderate assistance  Ambulation 10 ft: Partial/moderate assistance  Ambulation 50 ft: Partial/moderate assistance  Ambulation 150 ft: Partial/moderate assistance  Stairs - 1 Step: Partial/moderate assistance  Stairs - 4 Step: Partial/moderate assistance  Stairs - 12 Step:      OT    Eating: Setup or clean-up assistance  Oral Hygiene: Supervision or touching assistance  Bathing: Partial/moderate assistance  Upper Body Dressing: Partial/moderate assistance  Lower Body Dressing: Partial/moderate assistance  Toilet Transfer: Supervision or touching assistance  Toilet Hygiene: Supervision or touching assistance    Speech  necessary antiplatelet/anticoagulation  - ICA aneurysm stable on repeat CTA, follow-up with neurointerventional radiology as outpatient     #.  Posttraumatic seizures  - Continue Keppra 1.5 g twice daily  - No driving x 6 months     #.  Age-indeterminate T7 superior endplate fracture  - Out of bed as tolerated without brace, no bending/lifting/twisting > 8 lbs  - Follow-up with Ortho Spine     #.  EtOH use disorder  - Out of window for withdrawal  - Will discuss community resources to maintain sobriety  - Strictly recommend no EtOH for 12 months following TBI      #. Hyponatremia, resolved  - Na 137  - Continue sodium chloride tabs, will likely wean if stable repeat BMP     #.  Pruritic papular rash  - Possible drug-related rash  - Neurology consulted for consideration of alternative AED  - Hydrocortisone cream BID  - Atarax PRN for itching     CODE: Full Code  Diet: ADULT ORAL NUTRITION SUPPLEMENT; Breakfast, Lunch, Dinner; Standard High Calorie/High Protein Oral Supplement  ADULT DIET; Regular; No Caffeine  Bowels: Per protocol  Bladder: Per protocol   Sleep: Trazodone 50 mg nightly as needed  Pain: Tylenol as needed  DVT PPx: Lovenox 40 mg subcutaneous daily  Follow-ups: NSGY, MARCIA, Ortho Spine, PCP  ELOS: 16 days    Interdisciplinary team conference was held today with entire rehab treatment team including PT, OT, SLP (if applicable), Dietician, RN, and SW. Discussion focused on progress toward rehab goals and discharge planning. Making progress. Barriers include cognitive deficits, impulsivity, rash, spinal precautions. We as a medical team, and I as the physician , made a plan to work on these barriers to facilitate safe discharge. Plan will be presented to patient/family (if available).      aPnfilo Pond MD 8/29/2024, 6:49 PM    * This document was created using dictation software.  While all precautions were taken to ensure accuracy, errors may have occurred.  Please disregard any typographical

## 2024-08-29 NOTE — PROGRESS NOTES
[facility] - Inpatient Rehabilitation Department   Phone: [phone]    Physical Therapy    [] Initial Evaluation            [x] Daily Treatment Note         [] Discharge Summary      Patient: Meche Bansal   : 1960   MRN: [id number]   Date of Service:  2024  Admitting Diagnosis: Traumatic subdural hemorrhage with loss of consciousness of unspecified duration, initial encounter ([facility])    Current Admission Summary: Meche Bansal is a 63 y.o. female with PMHx notable for alcohol use disorder who presented to [facility] on  after fall down approximately 6 stairs. + LOC. + ETOH.  CT head demonstrated hemorrhagic contusions involving the bilateral frontal lobes (right greater than left) and left cerebellar hemisphere, right sided SDH with 2 mm leftward midline shift, multifocal traumatic subarachnoid hemorrhage, nondisplaced left occipital bone fracture extending into left occipital condyle.  CTA revealed grade 1B CVI involving the distal ICA bilaterally, and a small 3 x 2 mm left ICA aneurysm.  She was transferred to [facility].  She was monitored on continuous EEG, which was significant for 1 definite and multiple possible focal electrographic seizures.     Past Medical History:  has no past medical history on file.  Past Surgical History:  has a past surgical history that includes Hysterectomy and Dilatation, esophagus.    Discharge Recommendations: HHPT with 24 hr supervision  DME Required For Discharge: DME to be determined pending patient progress  Precautions/Restrictions: high fall risk  Weight Bearing Restrictions: no restrictions     Required Braces/Orthotics: no braces required  Positional Restrictions:Sternal Precautions - No Pushing, Pulling, Lifting > 8 lbs    Pre-Admission Information     Lives With: spouse, dog (Adenike).  (Fausto)- works full time but reports he may retire soon   Type of Home: house  Home Layout: two level, 6 + 6  stairs to 2nd level with R and L HR  Home Access:  2  step to enter without rails   Bathroom Layout: walk in shower  Bathroom Equipment:  no equipment   Toilet Height: standard height  Home Equipment: no prior equipment  Transfer Assistance: Independent without use of device  Ambulation Assistance:Independent without use of device  ADL Assistance: independent with all ADL's  IADL Assistance: independent with homemaking tasks  Active :        [x] Yes                 [] No  Hand Dominance: [] Left                 [x] Right  Current Employment: retired.  Occupation: [employer]   Hobbies: [employer], [employer], [employer]   Recent Falls: only 1 fall that led to this admission   Available Assistance at Discharge:  to be determined based off husbands work     Examination   Vision:   Vision Gross Assessment: Impaired and Vision Corrective Device: wears glasses for reading and for driving   Hearing:   WFL      Subjective  General: Patient in semi-oates's position upon arrival. Session begins with upper and lower body dressing per patient's request, pt assisted due to time constraints and spinal precautions. Patient agreeable to PT treatment.   Pain: 0/10  Pain Interventions: not applicable       Functional Mobility  Bed Mobility:  Supine to Sit: supervision  Scooting: supervision  Comments: Completed within pt's room - HOB elevated and use of bed rails   Transfers:  Sit to stand transfer: stand by assistance  Stand to sit transfer: stand by assistance  Toilet transfer: stand by assistance  Comments: All transfers completed without an AD. Multiple sit <=> stand transfers completed within session at various heights and surfaces.   Ambulation:  Surface:level surface  Assistive Device: no device  Assistance: contact guard assistance, (CGA/SBA)  Distance: 195' + 120' + multiple short distances within session.   Gait Mechanics: mild/moderate path deviation, narrow CATE, equal step length/height  Comments: VC to remain in middle of hallway, pt reaching for surfaces during

## 2024-08-29 NOTE — PROGRESS NOTES
[facility] - Inpatient Rehabilitation Department   Phone: [phone]    Speech Therapy    [] Initial Evaluation            [x] Daily Treatment Note         [] Discharge Summary      Patient: Meche Bansal   : 1960   MRN: [id number]   Date of Service:  2024  Admitting Diagnosis: Traumatic subdural hemorrhage with loss of consciousness of unspecified duration, initial encounter ([facility])  Current Admission Summary: See MD report   Past Medical History:  has no past medical history on file.  Past Surgical History:  has a past surgical history that includes Hysterectomy and Dilatation, esophagus.  Instrumental Swallow Study: FEES 2024  Assessment: Patient presents with oral dysphagia secondary to  absent mastication , complicated by cognitive status resulting in unchewed soft solid swallowed whole. Patient's pharyngeal swallow function is WFL as no penetration or aspiration of any consistency was observed. Swallow initiation is timely and no significant post-swallow pharyngeal residue is noted. Of note, patient observed with intermittent cough throughout assessment that occurred in the absence of penetration or aspiration. Patient also with poor scope tolerance. RN reports that patient has been demonstrating fluctuating levels of alertness since admission. Based on today's evaluation, recommend a Puree diet (IDDSI 4) with Thin liquids (IDDSI 0); meds crushed in puree; 1:1 feeding assist, hold tray if patient is not alert. Patient is at an increased risk of aspiration.   Precautions/Restrictions: high fall risk, spinal precautions- No Bending, Lifting, Twisting no lifting >8 lbs         Pre-Admission Information   Living Status: Pt lives with her , Fausto.   Occupation/School: Pt helps to take care of her grandchildren.   Medication Management: :  [x]Primary   []Secondary []No  Finance Management: [x]Primary   []Secondary []No  Active :   [x]Yes         []No  Hearing:  observed and directed patient's plan of care. Co-signed and supervised by:     Jessica Barr M.A. [id number]  Speech-Language Pathologist   8/29/2024 1:45 PM

## 2024-08-29 NOTE — PROGRESS NOTES
Pt. Assessment complete. Pt. Alert, sitting up in chair but impulsive. Sitter at bedside. Pt. Denies any complaints at this time.

## 2024-08-29 NOTE — PROGRESS NOTES
[facility] - Inpatient Rehabilitation Department   Phone: [phone]    Occupational Therapy    [] Initial Evaluation            [x] Daily Treatment Note         [] Discharge Summary      Patient: Meche Bansal   : 1960   MRN: [id number]   Date of Service:  2024    Admitting Diagnosis:  Traumatic subdural hemorrhage with loss of consciousness of unspecified duration, initial encounter ([facility])  Current Admission Summary:   Meche Bansla is a 63 y.o. female with PMHx notable for alcohol use disorder who presented to [facility] on  after fall down approximately 6 stairs. + LOC. + ETOH. GCS 11 on arrival. CT head demonstrated hemorrhagic contusions involving the bilateral frontal lobes (right greater than left) and left cerebellar hemisphere, right sided SDH with 2 mm leftward midline shift, multifocal traumatic subarachnoid hemorrhage, nondisplaced left occipital bone fracture extending into left occipital condyle.  CTA revealed grade 1 BCVI involving the distal ICA bilaterally, and a small 3 x 2 mm left ICA aneurysm.  She was transferred to [facility].  She was also found to have an age-indeterminate T7 compression fracture for which Ortho Spine was consulted, but did not recommend any acute surgical intervention. She was monitored on continuous EEG, which was significant for 1 definite and multiple possible focal electrographic seizures.  She failed swallow study and had NG tube placed for enteral nutrition.  She required multiple hypertonic saline boluses for hyponatremia, as well as electrolyte repletion for refeeding syndrome.  Her swallow function eventually improved and she is now tolerating PO diet. Hospital course was otherwise complicated by agitation requiring Precedex and intermittent use of nonviolent restraints.   Past Medical History:  has no past medical history on file.  Past Surgical History:  has a past surgical history that includes Hysterectomy and Dilatation, esophagus.    Discharge  Goals:  Time Frame:  7-10 days   Patient will complete upper body ADL at modified independent   Patient will complete lower body ADL at modified independent   Patient will complete toileting at modified independent   Patient will complete functional transfers at modified independent   Patient to complete simple IADL at modified independent     Above goals reviewed on 8/29/2024.  All goals are ongoing at this time unless indicated above.       Therapy Session Time     Individual Group Co-treatment   Time In 1404      Time Out 1504      Minutes 60         Timed Code Treatment Minutes: 60 minutes   Total Treatment Minutes:  60 minutes        Electronically Signed By: SIVAKUMAR Junior, LOIS OTR/L [id number]

## 2024-08-29 NOTE — PROGRESS NOTES
Pt. Complains of itching on back. Pt. Medicated with cream per MAR. Red rash noted to back, upper thigh, groin, neck. Sticky noted placed for M.D.

## 2024-08-29 NOTE — PROGRESS NOTES
Morning assessment completed, lert and oriented but impulsive, frequently gets up, no sitter at bedside as of now. The care plan and education has been reviewed and mutually agreed upon with the patient.  Pt remains free from falls. Fall precautions in place--bed in lowest position, call light within reach, bed alarm in use. Pt aware to call for assistance before getting up.

## 2024-08-29 NOTE — CARE COORDINATION
Met with patient and spouse today to discuss discharge planning.  Current recommendation is for patient to discharge home w/HHC and 24 hour supervision. Spouse works full time and doesn't think that he and family will be able to provide this type of care.  KELLY discussed adult day care options and provided spouse a list of open facilities in this area.  Discussed the [facility] and how they can sometimes assist with payment for these services.  Spouse was agreeable to a referral to their Fast Track Program.  Spouse also agreeable to HHC.  Pt's insurance plan has a very limited amount of providers that accept this plan.  The only provider known at this time is [facility] [employer] [facility].  KELLY contacted Jefe at this agency, [phone], who stated hat we must fax orders and information to [employer] first before the insurance company will approve for them to start services.  KELLY called [phone] and spoke with a rep who stated that the authorization when approved is only good for 5 days.  Rep suggested to fax in everything next Tuesday so the auth will be good when patient discharges.    KELLY faxed a referral to the [facility] Fast Track Program today.    Electronically signed by ANAHI Zabala, LORENA on 8/29/2024 at 4:14 PM

## 2024-08-30 PROBLEM — R21 SKIN RASH: Status: ACTIVE | Noted: 2024-08-30

## 2024-08-30 PROBLEM — F10.10 ETOH ABUSE: Status: ACTIVE | Noted: 2024-08-30

## 2024-08-30 PROBLEM — S06.9XAA TBI (TRAUMATIC BRAIN INJURY) (HCC): Status: ACTIVE | Noted: 2024-08-30

## 2024-08-30 PROCEDURE — 97116 GAIT TRAINING THERAPY: CPT

## 2024-08-30 PROCEDURE — 99222 1ST HOSP IP/OBS MODERATE 55: CPT | Performed by: PSYCHIATRY & NEUROLOGY

## 2024-08-30 PROCEDURE — 97535 SELF CARE MNGMENT TRAINING: CPT

## 2024-08-30 PROCEDURE — 97530 THERAPEUTIC ACTIVITIES: CPT

## 2024-08-30 PROCEDURE — APPSS60 APP SPLIT SHARED TIME 46-60 MINUTES

## 2024-08-30 PROCEDURE — APPNB30 APP NON BILLABLE TIME 0-30 MINS

## 2024-08-30 PROCEDURE — 97129 THER IVNTJ 1ST 15 MIN: CPT

## 2024-08-30 PROCEDURE — 97130 THER IVNTJ EA ADDL 15 MIN: CPT

## 2024-08-30 PROCEDURE — 1280000000 HC REHAB R&B

## 2024-08-30 PROCEDURE — 6370000000 HC RX 637 (ALT 250 FOR IP): Performed by: STUDENT IN AN ORGANIZED HEALTH CARE EDUCATION/TRAINING PROGRAM

## 2024-08-30 PROCEDURE — 6360000002 HC RX W HCPCS: Performed by: STUDENT IN AN ORGANIZED HEALTH CARE EDUCATION/TRAINING PROGRAM

## 2024-08-30 RX ADMIN — ENOXAPARIN SODIUM 40 MG: 100 INJECTION SUBCUTANEOUS at 09:28

## 2024-08-30 RX ADMIN — ACETAMINOPHEN 650 MG: 325 TABLET ORAL at 09:29

## 2024-08-30 RX ADMIN — PROPRANOLOL HYDROCHLORIDE 10 MG: 20 TABLET ORAL at 20:24

## 2024-08-30 RX ADMIN — ACETAMINOPHEN 650 MG: 325 TABLET ORAL at 01:14

## 2024-08-30 RX ADMIN — LEVETIRACETAM 1500 MG: 500 TABLET, FILM COATED ORAL at 09:29

## 2024-08-30 RX ADMIN — SODIUM CHLORIDE 2 G: 1 TABLET ORAL at 12:13

## 2024-08-30 RX ADMIN — PROPRANOLOL HYDROCHLORIDE 10 MG: 20 TABLET ORAL at 14:10

## 2024-08-30 RX ADMIN — THERA TABS 1 TABLET: TAB at 09:29

## 2024-08-30 RX ADMIN — TRAZODONE HYDROCHLORIDE 50 MG: 50 TABLET ORAL at 22:19

## 2024-08-30 RX ADMIN — SODIUM CHLORIDE 2 G: 1 TABLET ORAL at 17:22

## 2024-08-30 RX ADMIN — SODIUM CHLORIDE 2 G: 1 TABLET ORAL at 09:29

## 2024-08-30 RX ADMIN — PROPRANOLOL HYDROCHLORIDE 10 MG: 20 TABLET ORAL at 09:29

## 2024-08-30 RX ADMIN — HYDROXYZINE HYDROCHLORIDE 10 MG: 10 TABLET ORAL at 01:14

## 2024-08-30 RX ADMIN — HYDROXYZINE HYDROCHLORIDE 10 MG: 10 TABLET ORAL at 20:26

## 2024-08-30 RX ADMIN — HYDROCORTISONE: 0.01 CREAM TOPICAL at 09:30

## 2024-08-30 ASSESSMENT — PAIN SCALES - WONG BAKER: WONGBAKER_NUMERICALRESPONSE: NO HURT

## 2024-08-30 ASSESSMENT — PAIN - FUNCTIONAL ASSESSMENT: PAIN_FUNCTIONAL_ASSESSMENT: ACTIVITIES ARE NOT PREVENTED

## 2024-08-30 ASSESSMENT — PAIN DESCRIPTION - PAIN TYPE: TYPE: ACUTE PAIN

## 2024-08-30 ASSESSMENT — PAIN DESCRIPTION - ORIENTATION: ORIENTATION: MID

## 2024-08-30 ASSESSMENT — PAIN DESCRIPTION - LOCATION
LOCATION: HEAD
LOCATION: HEAD

## 2024-08-30 ASSESSMENT — PAIN SCALES - GENERAL
PAINLEVEL_OUTOF10: 6
PAINLEVEL_OUTOF10: 0
PAINLEVEL_OUTOF10: 7

## 2024-08-30 ASSESSMENT — PAIN DESCRIPTION - FREQUENCY: FREQUENCY: INTERMITTENT

## 2024-08-30 ASSESSMENT — PAIN DESCRIPTION - DESCRIPTORS
DESCRIPTORS: ACHING
DESCRIPTORS: ACHING

## 2024-08-30 ASSESSMENT — PAIN DESCRIPTION - ONSET: ONSET: ON-GOING

## 2024-08-30 NOTE — PLAN OF CARE
Problem: Discharge Planning  Goal: Discharge to home or other facility with appropriate resources  8/29/2024 2117 by Arielle Green RN  Outcome: Progressing  8/29/2024 0953 by Bekah Deleon RN  Outcome: Progressing     Problem: Safety - Adult  Goal: Free from fall injury  8/29/2024 2117 by Arielle Green RN  Outcome: Progressing  8/29/2024 0953 by Bekah Deleon RN  Outcome: Progressing     Problem: Nutrition Deficit:  Goal: Optimize nutritional status  Outcome: Progressing     Problem: Pain  Goal: Verbalizes/displays adequate comfort level or baseline comfort level  Outcome: Progressing

## 2024-08-30 NOTE — PROGRESS NOTES
[facility] - Inpatient Rehabilitation Department   Phone: [phone]    Occupational Therapy    [] Initial Evaluation            [x] Daily Treatment Note         [] Discharge Summary      Patient: Meche Bansal   : 1960   MRN: [id number]   Date of Service:  2024    Admitting Diagnosis:  Traumatic subdural hemorrhage with loss of consciousness of unspecified duration, initial encounter ([facility])  Current Admission Summary:   Meche Bansal is a 63 y.o. female with PMHx notable for alcohol use disorder who [place] [facility] [place]  after fall down approximately 6 stairs. + LOC. + ETOH. GCS 11 on arrival. CT head demonstrated hemorrhagic contusions involving the bilateral frontal lobes (right greater than left) and left cerebellar hemisphere, right sided SDH with 2 mm leftward midline shift, multifocal traumatic subarachnoid hemorrhage, nondisplaced left occipital bone fracture extending into left occipital condyle.  CTA revealed grade 1 BCVI involving the distal ICA bilaterally, and a small 3 x 2 mm left ICA aneurysm.  She was transferred to [facility].  She was also found to have an age-indeterminate T7 compression fracture for which Ortho Spine was consulted, but did not recommend any acute surgical intervention. She was monitored on continuous EEG, which was significant for 1 definite and multiple possible focal electrographic seizures.  She failed swallow study and had NG tube placed for enteral nutrition.  She required multiple hypertonic saline boluses for hyponatremia, as well as electrolyte repletion for refeeding syndrome.  Her swallow function eventually improved and she is now tolerating PO diet. Hospital course was otherwise complicated by agitation requiring Precedex and intermittent use of nonviolent restraints.   Past Medical History:  has no past medical history on file.  Past Surgical History:  has a past surgical history that includes Hysterectomy and Dilatation, esophagus.    Discharge  Recommendations: Home with HHOT and 24 hour supervision/assist     DME Required For Discharge: grab bars in shower      Precautions/Restrictions: high fall risk  Weight Bearing Restrictions: no restrictions   Required Braces/Orthotics: no braces required  Positional Restrictions:Spinal Precautions - No Bending, Lifting, Twisting no lifting >8 lbs     Pre-Admission Information   Lives With: spouse, dog (Adenike).  (Fausto)- works full time but reports he may retire soon   Type of Home: house  Home Layout: two level, 6 + 6  stairs to 2nd level with R and L HR  Home Access:  2 step to enter without rails   Bathroom Layout: walk in shower  Bathroom Equipment:  no equipment   Toilet Height: standard height  Home Equipment: no prior equipment  Transfer Assistance: Independent without use of device  Ambulation Assistance:Independent without use of device  ADL Assistance: independent with all ADL's  IADL Assistance: independent with homemaking tasks  Active :        [x] Yes  [] No  Hand Dominance: [] Left  [x] Right  Current Employment: retired.  Occupation:    Hobbies: [employer], [employer]   Recent Falls: only 1 fall that led to this admission   Available Assistance at Discharge:  to be determined based off husbands work     Examination   Vision:   Vision Corrective Device: wears glasses for reading no issues during visual tracking   Denies vision changes but reports some burning.   Hearing:   WFL      Subjective  General: Patient in room upon arrival, agreeable to OT session.  left during session. Sitter present at end of session.   Patient continues to be impulsive, restless and with decreased attention. Able to be re-directed. Cream applied to back for rash.   Pain: 0/10  Pain Interventions: not applicable        Activities of Daily Living  Basic Activities of Daily Living  Grooming: stand by assistance  Grooming Comments: blow dried and combed hair, applied face lotion, and brushed teeth  without use of UE support  Static Standing Balance: fair (+): maintains balance at SBA/supervision without use of UE support  Dynamic Standing Balance: fair (+): maintains balance at SBA/supervision without use of UE support  Comments:    Other Therapeutic Interventions  Provided patient with coloring pages and colored pencils in room for leisure activity and to improve attention to task. Patient able to attend to task for 5 mins before standing up and moving around room. Pages left in room for patient to use on her own time.     Patient completed organizing activity. Task required patient to organize \"junk drawer\" into 5 different containers based off a picture. Patient attended to task and organized all objects but did not correctly match picture. SBA for balance at elevated table.     Patient participated in puzzle activity standing at elevated table. Patient able to flip over all pieces so they were right side up. Unable to complete task due to patient needing to toilet. Puzzle left in dining room area in order to provide patient with activity over the weekend.     Functional Outcomes                                       Cognition  Overall Cognitive Status: Impaired  Arousal/Alertness: appropriate responses to stimuli  Following Commands: follows one step commands with repetition  Attention Span: difficulty dividing attention  Memory: decreased recall of precautions, decreased recall of recent events, decreased short term memory, decreased long term memory  Safety Judgement: decreased awareness of need for assistance, decreased awareness of need for safety  Problem Solving: assistance required to generate solutions, assistance required to implement solutions, decreased awareness of errors  Insights: decreased awareness of deficits  Initiation: requires cues for some  Sequencing: requires cues for some  Comments: continue to assess   Orientation:    oriented to person, disoriented to place, disoriented to time ,

## 2024-08-30 NOTE — CONSULTS
In patient Neurology consult        [facility]      MD Meche Troncoso  1960    Date of Service: 8/30/2024    Referring Physician: Panfilo Pond MD    Most of the history was obtained from detailed chart reviewing and discussion with the patient's spouse. The patient is not a good historian unable to provide me with accurate history.    Reason for the consult and CC: Acute diffuse rash, recently started Keppra, consideration for alternative AED.    HPI:   The patient is a 63 y.o.  years old female with past medical history of alcohol, and other medical problems who was admitted to ARU at [facility] following 2-week hospitalization at outside hospital following fall resulting in multiple subdural hematomas.  During hospitalization patient noted to have 1 episode of EEG seizures and was started on Keppra 1500 mg twice daily for seizure prophylaxis.  Patient noted to have diffuse rash on her back which started during hospitalization.  Patient was admitted to ARU few days ago.  There is concern for the rash being related to AED thus neurology was consulted.  Rash is primarily on the back diffuse.  Patient is reporting severe pruritus constantly changing positions from bed to chair.  Patient was given Benadryl ointment and trial hydroxyzine with minimal relief.  During examination patient had gotten up multiple times to use the restroom with urine urgency.  She does report headache which is constant.  Does get relief with Tylenol.  She denies neck pain, vision changes, dysarthria, dysphagia.  Other review of systems unremarkable        I personally reviewed and updated social history, past medical history, medications, allergy, surgical history, and family history as documented in the patient's electronic health records.       ROS: 10-14 system review, reviewed with patient's family and/or nurse was unremarkable except mentioned in HPI.     Constitutional:    Vitals:    08/28/24 1919 08/29/24 0745 08/29/24 1945 08/30/24 0827   BP: 123/65 (!) 143/91 118/75 124/80   Pulse: (!) 103  91 78   Resp: 20 18 18 18   Temp: 97.9 °F (36.6 °C) 99.4 °F (37.4 °C) 99.4 °F (37.4 °C) 98.4 °F (36.9 °C)   TempSrc: Oral Oral Oral Oral   SpO2: 99% 98% 97% 96%   Weight:       Height:          General appearance: Confused but appears in no acute distress  Mental Status:   AAO times one.   Attention and concentration: poor, waxing and waning.   Language: Fluent speech.  Can follow directions.  Recent memory: Impaired  Remote memory: Impaired  Poor fund of knowledge  Cranial Nerves:   II: Pupils: equal, round, reactive to light  III,IV,VI: No gaze preference. No nystagmus  V: Facial sensation: Grossly unremarkable.  VII: Facial strength and movements: intact and symmetric  XII: Tongue movements : midline  Musculoskeletal: Diffuse pruritic rash throughout upper lower back.  The patient can move all 4 extremities.  No apparent focal weakness.    Plantars: Flexor bilaterally  Tone: Normal tone.  No rigidity.  Reflexes: symmetric 2+ in both arms and legs  Coordination: No tremors  Sensation: No major sensory loss   Gait: Steady gait      MDM:         A. Problems (any 1)    High:    [x] Acute/Chronic Illness/injury posing threat to life or bodily function:    [] Severe exacerbation of chronic illness:      Moderate:    []     1 or more chronic illness with exacerbation, progression or side effect of treatment or  []     2 or more stable chronic illnesses or  []     1 acute illness with systemic symptoms     ---------------------------------------------------------------------  B. Risk of Treatment (any 1)   [x] Drugs/treatments that require intensive monitoring for toxicity include: AED  [] Change in code status:    [] Decision to escalate care:    [] Major surgery/procedure with associated risk factors:    [x] Prescription drug

## 2024-08-30 NOTE — PROGRESS NOTES
Morning assessment completed, vss, alert and oriented but anxious, restless, complains of headache, will give schedule meds,The care plan and education has been reviewed and mutually agreed upon with the patient. Pt remains free from falls. Fall precautions in place--bed in lowest position, call light within reach, bed alarm in use. Pt aware to call for assistance before getting up.

## 2024-08-30 NOTE — CARE COORDINATION
SW called the [facility] Fast Track Program at [phone] and left a message asking if they received the referral I asked yesterday.  SW left the referral on their voicemail as well with pt's and spouse's request for Adult Day Services and also left spouse's number to call.    Electronically signed by ANAHI Zabala, LORENA on 8/30/2024 at 1:27 PM

## 2024-08-30 NOTE — PROGRESS NOTES
Patient complained of itching on her nohemy area, skin assessed around that area, there are no rashes there, no redness, applies anti itch cream. Night nurse will give atarax for itching.

## 2024-08-30 NOTE — PROGRESS NOTES
Meche Bansal  8/30/2024  [id number]    Chief Complaint: Traumatic subdural hemorrhage with loss of consciousness of unspecified duration, initial encounter ([facility])    Subjective:   No acute events overnight.    Patient continues to have pruritus related to her back rash. Also notes dull left sided headache pain. Denies any fevers, chills, chest pain, dyspnea. Denies any dysuria.     Last BM: Stool Occurrence: 0 (08/29/24 2313)    Objective:  Patient Vitals for the past 24 hrs:   BP Temp Temp src Pulse Resp SpO2   08/30/24 0827 124/80 98.4 °F (36.9 °C) Oral 78 18 96 %   08/29/24 1945 118/75 99.4 °F (37.4 °C) Oral 91 18 97 %     Gen: No distress, pleasant.   HEENT: Normocephalic, atraumatic.   CV: Regular rate and rhythm. Extremities warm, well perfused.   Resp: No respiratory distress. CTAB.  Abd: Soft, nontender  Ext: No edema.   Skin: Diffuse papular rash w/ excoriations involving back, stable from prior.   Neuro: Alert, oriented to person and place. Restless, pacing the room.     Laboratory data: Available via EMR.     Therapy progress:       PT    Supine to Sit: Supervision or touching assistance  Sit to Supine: Supervision or touching assistance   Sit to Stand: Supervision or touching assistance  Chair/Bed to Chair Transfer: Partial/moderate assistance  Car Transfer: Partial/moderate assistance  Ambulation 10 ft: Partial/moderate assistance  Ambulation 50 ft: Partial/moderate assistance  Ambulation 150 ft: Partial/moderate assistance  Stairs - 1 Step: Partial/moderate assistance  Stairs - 4 Step: Partial/moderate assistance  Stairs - 12 Step:      OT    Eating: Setup or clean-up assistance  Oral Hygiene: Supervision or touching assistance  Bathing: Supervision or touching assistance  Upper Body Dressing: Supervision or touching assistance  Lower Body Dressing: Supervision or touching assistance  Toilet Transfer: Supervision or touching assistance  Toilet Hygiene: Supervision or touching assistance    Speech   While all precautions were taken to ensure accuracy, errors may have occurred.  Please disregard any typographical errors.

## 2024-08-30 NOTE — PROGRESS NOTES
Pt. Assessment complete. Pt. Complains of itching to back. Cortisone cream applied per MAR.  Pt. Impulsive, and confused. Sitter at bedside for patient safety.

## 2024-08-30 NOTE — PLAN OF CARE
Problem: Discharge Planning  Goal: Discharge to home or other facility with appropriate resources  8/30/2024 0907 by Bekah Deleon, RN  Outcome: Progressing     Problem: Safety - Adult  Goal: Free from fall injury  8/30/2024 0907 by Bekah Deleon, RN  Outcome: Progressing

## 2024-08-30 NOTE — PROGRESS NOTES
[facility] - Inpatient Rehabilitation Department   Phone: [phone]    Speech Therapy    [] Initial Evaluation            [x] Daily Treatment Note         [] Discharge Summary      Patient: Meche Bansal   : 1960   MRN: [id number]   Date of Service:  2024  Admitting Diagnosis: Traumatic subdural hemorrhage with loss of consciousness of unspecified duration, initial encounter ([facility])  Current Admission Summary: See MD report   Past Medical History:  has no past medical history on file.  Past Surgical History:  has a past surgical history that includes Hysterectomy and Dilatation, esophagus.  Instrumental Swallow Study: FEES 2024  Assessment: Patient presents with oral dysphagia secondary to  absent mastication , complicated by cognitive status resulting in unchewed soft solid swallowed whole. Patient's pharyngeal swallow function is WFL as no penetration or aspiration of any consistency was observed. Swallow initiation is timely and no significant post-swallow pharyngeal residue is noted. Of note, patient observed with intermittent cough throughout assessment that occurred in the absence of penetration or aspiration. Patient also with poor scope tolerance. RN reports that patient has been demonstrating fluctuating levels of alertness since admission. Based on today's evaluation, recommend a Puree diet (IDDSI 4) with Thin liquids (IDDSI 0); meds crushed in puree; 1:1 feeding assist, hold tray if patient is not alert. Patient is at an increased risk of aspiration.   Precautions/Restrictions: high fall risk, spinal precautions- No Bending, Lifting, Twisting no lifting >8 lbs         Pre-Admission Information   Living Status: Pt lives with her , Fausto.   Occupation/School: Pt helps to take care of her grandchildren.   Medication Management: :  [x]Primary   []Secondary []No  Finance Management: [x]Primary   []Secondary []No  Active :   [x]Yes         []No  Hearing:     WFL  Vision:    Vision Gross Assessment: [facility]      Subjective  General: Pt was alert and willing to participate. Attention was significantly limited. Limited ability to attend to structured table top task.   Pain: Denies    Safety Interventions: patient left in bed, bed alarm in place, and sitter present        Therapeutic Interventions:     Session 1:    Cognition: Severity: Severe   Orientation  -administered the Orientation Log (O-log) to assess current orientation status and insight  - year and pathology deficits not assessed  City: 3  Kind of place: 3  Name of hospital: 3  Month: 2  Date: 0  Day of week: 3  Clock time: 3  Etiology/event: 3     Functional recall/personal history recall   - Pt was unable to recall the names of her children, she instead named her grandchildren, required min cues   - Pt was able to recall where her children lived  - Struggled to recall which hospital she was first taken to, benefited from mod cues  - Pt reported she doesn't recall her actual fall or trip to the hospital    Insight to deficits/safety awareness   - Pt repeatedly got up (x4) during session, unaware of safety or spinal precautions  - Pt demonstrated knowledge of importance of gait belt use when walking    Sustained attention to session tasks  - pt was able to sustain attention for 5 minutes of our 30 minute session (filled in dates on a calendar)   - pt benefited from getting up and moving while in session    Additional Interventions:      Session 2:     Cognition: Severity: Severe   Orientation- administered portion of the Orientation Log (O-Log) to assess current orientation status and insight   -kind of site and pathology deficits not assessed  - City: 2  -Name of Hospital: 2  - Month: 3  - Date: 3  - Year: 3  - Day of Week: 3  - Clock Time: 3  - Etiology/ Event: 3    Recall of personal history  - Recalled birth dates of her and spouse   - Reduced recall of children/grandchildren birth dates, limited recognition  promote recall of newly learned information with >80% min cues.    Above goals reviewed on 8/30/2024. All goals are ongoing at this time unless indicated above.       Therapy Session Time      Session 1 Session 2   Time In 0830 1245   Time Out 0930 1325   Time Code Minutes 30 40   Individual Minutes 30 40     Timed Code Treatment Minutes:  70  Total Treatment Minutes:  70    Electronically Signed By:   GISELLA Rhodes,  Clinician  Speech Language Pathology    Speech Language Pathologist observed and directed patient's plan of care. Co-signed and supervised by:   Jessica Barr M.A. [facility] S.PMelvina [id number]  Speech-Language Pathologist   8/30/2024 11:50 AM

## 2024-08-30 NOTE — PROGRESS NOTES
[facility] - Inpatient Rehabilitation Department   Phone: [phone]    Physical Therapy    [] Initial Evaluation            [x] Daily Treatment Note         [] Discharge Summary      Patient: Meche Bansal   : 1960   MRN: [id number]   Date of Service:  2024  Admitting Diagnosis: Traumatic subdural hemorrhage with loss of consciousness of unspecified duration, initial encounter ([facility])    Current Admission Summary: Meche Bansal is a 63 y.o. female with PMHx notable for alcohol use disorder who presented to [facility] on  after fall down approximately 6 stairs. + LOC. + ETOH.  CT head demonstrated hemorrhagic contusions involving the bilateral frontal lobes (right greater than left) and left cerebellar hemisphere, right sided SDH with 2 mm leftward midline shift, multifocal traumatic subarachnoid hemorrhage, nondisplaced left occipital bone fracture extending into left occipital condyle.  CTA revealed grade 1B CVI involving the distal ICA bilaterally, and a small 3 x 2 mm left ICA aneurysm.  She was transferred to [facility].  She was monitored on continuous EEG, which was significant for 1 definite and multiple possible focal electrographic seizures.     Past Medical History:  has no past medical history on file.  Past Surgical History:  has a past surgical history that includes Hysterectomy and Dilatation, esophagus.    Discharge Recommendations: HHPT with 24 hr supervision  DME Required For Discharge: DME to be determined pending patient progress  Precautions/Restrictions: high fall risk  Weight Bearing Restrictions: no restrictions     Required Braces/Orthotics: no braces required  Positional Restrictions:Sternal Precautions - No Pushing, Pulling, Lifting > 8 lbs    Pre-Admission Information     Lives With: spouse, dog (Adenike).  (Fausto)- works full time but reports he may retire soon   Type of Home: house  Home Layout: two level, 6 + 6  stairs to 2nd level with R and L HR  Home Access:  2  return to [facility] to promote independence.     Safety Interventions: patient left in bed, bed alarm in place, call light within reach, gait belt, patient at risk for falls, nurse notified, and pt has assigned sitter, but he left room during PT session    Plan  Frequency: 5 x/week, 60 min/day  Current Treatment Recommendations: strengthening, ROM, balance training, functional mobility training, transfer training, gait training, stair training, endurance training, neuromuscular re-education, patient/caregiver education, ADL/self-care training, cognitive/perceptual training, home exercise program, and safety education    Goals  Patient Goals: To return home   Short Term Goals:  Time Frame: 7-10 days    Patient will complete bed mobility at [facility]   Patient will complete transfers at [facility]   Patient will ambulate 270 ft with use of LRAD at [facility]  Patient will ascend/descend 12 stairs with (R) ascending handrail at modified independent  Patient will complete car transfer at [facility]  Patient will improve Tinetti Balance Test score = to or > than 24 indicating a decreased risk of falling    Above goals reviewed on 8/30/2024.  All goals are ongoing at this time unless indicated above.      Therapy Session Time      Individual Group Co-treatment   Time In  0830       Time Out  0930       Minutes  60             Total Treatment Minutes:  60  Minutes      Electronically Signed By: Shannan Lopes, PT, DPT, CNS [id number]

## 2024-08-31 PROCEDURE — 1280000000 HC REHAB R&B

## 2024-08-31 PROCEDURE — 97129 THER IVNTJ 1ST 15 MIN: CPT

## 2024-08-31 PROCEDURE — 97535 SELF CARE MNGMENT TRAINING: CPT

## 2024-08-31 PROCEDURE — 97130 THER IVNTJ EA ADDL 15 MIN: CPT

## 2024-08-31 PROCEDURE — 6360000002 HC RX W HCPCS: Performed by: STUDENT IN AN ORGANIZED HEALTH CARE EDUCATION/TRAINING PROGRAM

## 2024-08-31 PROCEDURE — 97110 THERAPEUTIC EXERCISES: CPT

## 2024-08-31 PROCEDURE — 6370000000 HC RX 637 (ALT 250 FOR IP): Performed by: STUDENT IN AN ORGANIZED HEALTH CARE EDUCATION/TRAINING PROGRAM

## 2024-08-31 PROCEDURE — 97530 THERAPEUTIC ACTIVITIES: CPT

## 2024-08-31 PROCEDURE — 97116 GAIT TRAINING THERAPY: CPT

## 2024-08-31 RX ADMIN — ENOXAPARIN SODIUM 40 MG: 100 INJECTION SUBCUTANEOUS at 08:17

## 2024-08-31 RX ADMIN — HYDROCORTISONE: 0.01 CREAM TOPICAL at 08:16

## 2024-08-31 RX ADMIN — THERA TABS 1 TABLET: TAB at 08:17

## 2024-08-31 RX ADMIN — TRAZODONE HYDROCHLORIDE 50 MG: 50 TABLET ORAL at 21:37

## 2024-08-31 RX ADMIN — HYDROCORTISONE: 0.01 CREAM TOPICAL at 21:38

## 2024-08-31 RX ADMIN — SODIUM CHLORIDE 2 G: 1 TABLET ORAL at 08:17

## 2024-08-31 RX ADMIN — PROPRANOLOL HYDROCHLORIDE 10 MG: 20 TABLET ORAL at 21:36

## 2024-08-31 RX ADMIN — SODIUM CHLORIDE 2 G: 1 TABLET ORAL at 12:00

## 2024-08-31 RX ADMIN — ACETAMINOPHEN 650 MG: 325 TABLET ORAL at 18:17

## 2024-08-31 RX ADMIN — SODIUM CHLORIDE 2 G: 1 TABLET ORAL at 18:17

## 2024-08-31 RX ADMIN — HYDROXYZINE HYDROCHLORIDE 10 MG: 10 TABLET ORAL at 21:38

## 2024-08-31 RX ADMIN — ACETAMINOPHEN 650 MG: 325 TABLET ORAL at 13:42

## 2024-08-31 RX ADMIN — PROPRANOLOL HYDROCHLORIDE 10 MG: 20 TABLET ORAL at 08:17

## 2024-08-31 RX ADMIN — PROPRANOLOL HYDROCHLORIDE 10 MG: 20 TABLET ORAL at 13:42

## 2024-08-31 ASSESSMENT — PAIN DESCRIPTION - DESCRIPTORS
DESCRIPTORS: ACHING
DESCRIPTORS: ACHING

## 2024-08-31 ASSESSMENT — PAIN DESCRIPTION - LOCATION
LOCATION: HEAD
LOCATION: HEAD

## 2024-08-31 ASSESSMENT — PAIN SCALES - GENERAL
PAINLEVEL_OUTOF10: 5
PAINLEVEL_OUTOF10: 0
PAINLEVEL_OUTOF10: 5

## 2024-08-31 NOTE — PROGRESS NOTES
Patient's back is extremely pruritic and with a diffuse red rash. I applied moist wash cloths and then a layer of petroleum jelly which the patient stated was soothing and she was then able to sleep. Hydrocortisone not available at that time.

## 2024-08-31 NOTE — PROGRESS NOTES
[facility] - Inpatient Rehabilitation Department   Phone: [phone]    Physical Therapy    [] Initial Evaluation            [x] Daily Treatment Note         [] Discharge Summary      Patient: Meche Bansal   : 1960   MRN: [id number]   Date of Service:  2024  Admitting Diagnosis: Traumatic subdural hemorrhage with loss of consciousness of unspecified duration, initial encounter ([facility])    Current Admission Summary: Meche Bansal is a 63 y.o. female with PMHx notable for alcohol use disorder who [place] [facility] [place]  after fall down approximately 6 stairs. + LOC. + ETOH.  CT head demonstrated hemorrhagic contusions involving the bilateral frontal lobes (right greater than left) and left cerebellar hemisphere, right sided SDH with 2 mm leftward midline shift, multifocal traumatic subarachnoid hemorrhage, nondisplaced left occipital bone fracture extending into left occipital condyle.  CTA revealed grade 1B CVI involving the distal ICA bilaterally, and a small 3 x 2 mm left ICA aneurysm.  She was transferred to [facility].  She was monitored on continuous EEG, which was significant for 1 definite and multiple possible focal electrographic seizures.     Past Medical History:  has no past medical history on file.  Past Surgical History:  has a past surgical history that includes Hysterectomy and Dilatation, esophagus.    Discharge Recommendations: HHPT with 24 hr supervision  DME Required For Discharge: no DME required at discharge  Precautions/Restrictions: high fall risk  Weight Bearing Restrictions: no restrictions     Required Braces/Orthotics: no braces required  Positional Restrictions:Sternal Precautions - No Pushing, Pulling, Lifting > 8 lbs    Pre-Admission Information     Lives With: spouse, dog (Adenike).  (Fausto)- works full time but reports he may retire soon   Type of Home: house  Home Layout: two level, 6 + 6  stairs to 2nd level with R and L HR  Home Access:  2 step to enter  assistance, decreased awareness of need for safety  Problem Solving: assistance required to generate solutions, assistance required to implement solutions, decreased awareness of errors  Insights: decreased awareness of deficits  Initiation: requires cues for some  Sequencing: requires cues for some  Comments: Patient highly impulsive - continue to assess   Orientation:    oriented to person, oriented to place, disoriented to time , and disoriented to situation - did not discuss situation this date  Command Following:   impaired - inconsistent following of simple commands, unable to follow multi-step commands, poor short/long term memory.     Education  Barriers To Learning: cognition  Patient Education: patient educated on goals, PT role and benefits, plan of care, general safety, functional mobility training, energy conservation, family education, transfer training  Learning Assessment:  patient will require reinforcement due to cognitive deficits    Assessment  Activity Tolerance: Patient continues to be very impulsive within session and requires max cues to attend to tasks. Pt's strength and endurance continue to improve as she was constantly in movement this session.    Impairments Requiring Therapeutic Intervention: decreased functional mobility, decreased ADL status, decreased strength, decreased safety awareness, decreased cognition, decreased endurance, decreased balance, decreased posture  Prognosis: fair    Clinical Assessment: Patient continues to progress ambulation distance as well as steadiness without an assistive device at this date. Patient continues to be limited by impulsivity and impaired cognition within sessions requiring frequent redirection to activities. Prior to hospitalization, patient was independent without an assistive device for all functional mobility. Patient would benefit from skilled PT to address the above deficits and return to [facility] to promote independence.     Safety

## 2024-08-31 NOTE — PROGRESS NOTES
[facility] - Inpatient Rehabilitation Department   Phone: [phone]    Speech Therapy    [] Initial Evaluation            [x] Daily Treatment Note         [] Discharge Summary      Patient: Meche Bansal   : 1960   MRN: [id number]   Date of Service:  2024  Admitting Diagnosis: Traumatic subdural hemorrhage with loss of consciousness of unspecified duration, initial encounter ([facility])  Current Admission Summary: See MD report   Past Medical History:  has no past medical history on file.  Past Surgical History:  has a past surgical history that includes Hysterectomy and Dilatation, esophagus.  Instrumental Swallow Study: FEES 2024  Assessment: Patient presents with oral dysphagia secondary to  absent mastication , complicated by cognitive status resulting in unchewed soft solid swallowed whole. Patient's pharyngeal swallow function is WFL as no penetration or aspiration of any consistency was observed. Swallow initiation is timely and no significant post-swallow pharyngeal residue is noted. Of note, patient observed with intermittent cough throughout assessment that occurred in the absence of penetration or aspiration. Patient also with poor scope tolerance. RN reports that patient has been demonstrating fluctuating levels of alertness since admission. Based on today's evaluation, recommend a Puree diet (IDDSI 4) with Thin liquids (IDDSI 0); meds crushed in puree; 1:1 feeding assist, hold tray if patient is not alert. Patient is at an increased risk of aspiration.   Precautions/Restrictions: high fall risk, spinal precautions- No Bending, Lifting, Twisting no lifting >8 lbs         Pre-Admission Information   Living Status: Pt lives with her , Fausto.   Occupation/School: Pt helps to take care of her grandchildren.   Medication Management: :  [x]Primary   []Secondary []No  Finance Management: [x]Primary   []Secondary []No  Active :   [x]Yes         []No  Hearing:

## 2024-08-31 NOTE — PROGRESS NOTES
[facility] - Inpatient Rehabilitation Department   Phone: [phone]    Occupational Therapy    [] Initial Evaluation            [x] Daily Treatment Note         [] Discharge Summary      Patient: Meche Bansal   : 1960   MRN: [id number]   Date of Service:  2024    Admitting Diagnosis:  Traumatic subdural hemorrhage with loss of consciousness of unspecified duration, initial encounter ([facility])  Current Admission Summary:   Meche Bansal is a 63 y.o. female with PMHx notable for alcohol use disorder who presented to [facility] on  after fall down approximately 6 stairs. + LOC. + ETOH. GCS 11 on arrival. CT head demonstrated hemorrhagic contusions involving the bilateral frontal lobes (right greater than left) and left cerebellar hemisphere, right sided SDH with 2 mm leftward midline shift, multifocal traumatic subarachnoid hemorrhage, nondisplaced left occipital bone fracture extending into left occipital condyle.  CTA revealed grade 1 BCVI involving the distal ICA bilaterally, and a small 3 x 2 mm left ICA aneurysm.  She was transferred to [facility].  She was also found to have an age-indeterminate T7 compression fracture for which Ortho Spine was consulted, but did not recommend any acute surgical intervention. She was monitored on continuous EEG, which was significant for 1 definite and multiple possible focal electrographic seizures.  She failed swallow study and had NG tube placed for enteral nutrition.  She required multiple hypertonic saline boluses for hyponatremia, as well as electrolyte repletion for refeeding syndrome.  Her swallow function eventually improved and she is now tolerating PO diet. Hospital course was otherwise complicated by agitation requiring Precedex and intermittent use of nonviolent restraints.   Past Medical History:  has no past medical history on file.  Past Surgical History:  has a past surgical history that includes Hysterectomy and Dilatation, esophagus.    Discharge  assistance  Lower Extremity Dressing: stand by assistance  Dressing Comments: to david bra, shirt and pants, sat to don underwear and pants without requiring cuing   General Comments:  patient required cuing/supervision to initiate dressing tasks and to organize/find clothing     Instrumental Activities of Daily Living  Meal Prep: stand by assistance.    Meal Prep Equipment: no device utilized  Meal Prep Comments: patient able to gather familiar items in kitchenette including fixing herself a glass of water, putting her tea bag into cup, required assist with hot water tap on  (reports she typically heats water in microwave at home)   Light Housekeeping: minimal assistance.    Light Housekeeping Equipment: none  Light Housekeeping Comments: patient assisted in the changing of her bed sheets - able to remove sheets from bed and place in linens, able to gather supplies from linen cart with cues, upon returning to her room dropped fitted sheet on bed and states \"there, that will have to do\"  and declined futher participation in activity     Functional Mobility  Bed Mobility:  Bed mobility not completed on this date.  Comments:   Transfers:  Sit to stand transfer:stand by assistance  Stand to sit transfer: stand by assistance  Bed / Chair transfer: stand by assistance.    Comments: to/from recliner, couch, chair, toilet and EOB. Multiple transfers throughout session    Functional Mobility  Functional Mobility Activity: to/from bathroom, to/from therapy room  Device Use: no device  Required Assistance: stand by assistance  Comment: no major LOB noted   Balance:  Static Sitting Balance: fair (+): maintains balance at SBA/supervision without use of UE support  Dynamic Sitting Balance: fair (+): maintains balance at SBA/supervision without use of UE support  Static Standing Balance: fair (+): maintains balance at SBA/supervision without use of UE support  Dynamic Standing Balance: fair (+): maintains balance at

## 2024-09-01 VITALS
SYSTOLIC BLOOD PRESSURE: 152 MMHG | BODY MASS INDEX: 21.09 KG/M2 | WEIGHT: 119 LBS | HEIGHT: 63 IN | TEMPERATURE: 98.7 F | HEART RATE: 80 BPM | DIASTOLIC BLOOD PRESSURE: 79 MMHG | RESPIRATION RATE: 16 BRPM | OXYGEN SATURATION: 98 %

## 2024-09-01 LAB
BILIRUB UR QL STRIP.AUTO: NEGATIVE
CLARITY UR: CLEAR
COLOR UR: YELLOW
GLUCOSE UR STRIP.AUTO-MCNC: NEGATIVE MG/DL
HGB UR QL STRIP.AUTO: NEGATIVE
KETONES UR STRIP.AUTO-MCNC: NEGATIVE MG/DL
LEUKOCYTE ESTERASE UR QL STRIP.AUTO: NEGATIVE
NITRITE UR QL STRIP.AUTO: NEGATIVE
PH UR STRIP.AUTO: 6 [PH] (ref 5–8)
PROT UR STRIP.AUTO-MCNC: NEGATIVE MG/DL
SP GR UR STRIP.AUTO: <=1.005 (ref 1–1.03)
UA COMPLETE W REFLEX CULTURE PNL UR: NORMAL
UA DIPSTICK W REFLEX MICRO PNL UR: NORMAL
URN SPEC COLLECT METH UR: NORMAL
UROBILINOGEN UR STRIP-ACNC: 1 E.U./DL

## 2024-09-01 PROCEDURE — 97130 THER IVNTJ EA ADDL 15 MIN: CPT

## 2024-09-01 PROCEDURE — 6370000000 HC RX 637 (ALT 250 FOR IP): Performed by: STUDENT IN AN ORGANIZED HEALTH CARE EDUCATION/TRAINING PROGRAM

## 2024-09-01 PROCEDURE — 6360000002 HC RX W HCPCS: Performed by: STUDENT IN AN ORGANIZED HEALTH CARE EDUCATION/TRAINING PROGRAM

## 2024-09-01 PROCEDURE — 97129 THER IVNTJ 1ST 15 MIN: CPT

## 2024-09-01 PROCEDURE — 81003 URINALYSIS AUTO W/O SCOPE: CPT

## 2024-09-01 PROCEDURE — 97116 GAIT TRAINING THERAPY: CPT

## 2024-09-01 PROCEDURE — 97535 SELF CARE MNGMENT TRAINING: CPT

## 2024-09-01 PROCEDURE — 1280000000 HC REHAB R&B

## 2024-09-01 PROCEDURE — 6370000000 HC RX 637 (ALT 250 FOR IP): Performed by: PHYSICAL MEDICINE & REHABILITATION

## 2024-09-01 PROCEDURE — 97530 THERAPEUTIC ACTIVITIES: CPT

## 2024-09-01 RX ORDER — ALPRAZOLAM 0.25 MG
0.25 TABLET ORAL 3 TIMES DAILY PRN
Status: DISPENSED | OUTPATIENT
Start: 2024-09-01

## 2024-09-01 RX ORDER — DIPHENHYDRAMINE HCL 25 MG
25 TABLET ORAL EVERY 8 HOURS PRN
Status: ACTIVE | OUTPATIENT
Start: 2024-09-01

## 2024-09-01 RX ADMIN — HYDROCORTISONE: 0.01 CREAM TOPICAL at 08:17

## 2024-09-01 RX ADMIN — PROPRANOLOL HYDROCHLORIDE 10 MG: 20 TABLET ORAL at 22:14

## 2024-09-01 RX ADMIN — SODIUM CHLORIDE 2 G: 1 TABLET ORAL at 08:18

## 2024-09-01 RX ADMIN — SODIUM CHLORIDE 2 G: 1 TABLET ORAL at 14:27

## 2024-09-01 RX ADMIN — ENOXAPARIN SODIUM 40 MG: 100 INJECTION SUBCUTANEOUS at 08:18

## 2024-09-01 RX ADMIN — SODIUM CHLORIDE 2 G: 1 TABLET ORAL at 17:07

## 2024-09-01 RX ADMIN — THERA TABS 1 TABLET: TAB at 08:18

## 2024-09-01 RX ADMIN — HYDROCORTISONE: 0.01 CREAM TOPICAL at 22:15

## 2024-09-01 RX ADMIN — ACETAMINOPHEN 650 MG: 325 TABLET ORAL at 05:53

## 2024-09-01 RX ADMIN — PROPRANOLOL HYDROCHLORIDE 10 MG: 20 TABLET ORAL at 08:18

## 2024-09-01 RX ADMIN — HYDROXYZINE HYDROCHLORIDE 10 MG: 10 TABLET ORAL at 05:53

## 2024-09-01 RX ADMIN — PROPRANOLOL HYDROCHLORIDE 10 MG: 20 TABLET ORAL at 14:27

## 2024-09-01 RX ADMIN — ALPRAZOLAM 0.25 MG: 0.25 TABLET ORAL at 17:07

## 2024-09-01 RX ADMIN — ACETAMINOPHEN 650 MG: 325 TABLET ORAL at 18:44

## 2024-09-01 RX ADMIN — HYDROXYZINE HYDROCHLORIDE 10 MG: 10 TABLET ORAL at 15:57

## 2024-09-01 ASSESSMENT — PAIN SCALES - GENERAL
PAINLEVEL_OUTOF10: 0
PAINLEVEL_OUTOF10: 5
PAINLEVEL_OUTOF10: 7

## 2024-09-01 ASSESSMENT — PAIN DESCRIPTION - LOCATION
LOCATION: LEG
LOCATION: FACE;HEAD

## 2024-09-01 ASSESSMENT — PAIN - FUNCTIONAL ASSESSMENT
PAIN_FUNCTIONAL_ASSESSMENT: ACTIVITIES ARE NOT PREVENTED
PAIN_FUNCTIONAL_ASSESSMENT: ACTIVITIES ARE NOT PREVENTED

## 2024-09-01 ASSESSMENT — PAIN DESCRIPTION - ORIENTATION
ORIENTATION: RIGHT;LEFT
ORIENTATION: RIGHT

## 2024-09-01 ASSESSMENT — PAIN DESCRIPTION - DESCRIPTORS: DESCRIPTORS: ACHING

## 2024-09-01 NOTE — PROGRESS NOTES
Pt. Is restless, roaming around room and coming out into the hallway. Pt. Currently has order for seizure precautions but it needs to be d/c'd if pt. Is allowed to roam around. Pt. Asking for something every 15 minutes whether it be a drink, a walk, something for itching, etc. She needs limitations. Pt. Asking for wine.

## 2024-09-01 NOTE — PLAN OF CARE
Problem: Discharge Planning  Goal: Discharge to home or other facility with appropriate resources  Outcome: Progressing  Flowsheets (Taken 8/31/2024 2106)  Discharge to home or other facility with appropriate resources: Identify barriers to discharge with patient and caregiver     Problem: Safety - Adult  Goal: Free from fall injury  Outcome: Progressing  Flowsheets (Taken 9/1/2024 0209)  Free From Fall Injury: Instruct family/caregiver on patient safety     Problem: Nutrition Deficit:  Goal: Optimize nutritional status  Outcome: Progressing  Flowsheets (Taken 9/1/2024 0210)  Nutrient intake appropriate for improving, restoring, or maintaining nutritional needs: Monitor oral intake, labs, and treatment plans     Problem: Pain  Goal: Verbalizes/displays adequate comfort level or baseline comfort level  Outcome: Progressing  Flowsheets (Taken 8/31/2024 2007)  Verbalizes/displays adequate comfort level or baseline comfort level: Assess pain using appropriate pain scale     Problem: Skin/Tissue Integrity - Adult  Goal: Skin integrity remains intact  Outcome: Progressing  Flowsheets (Taken 8/31/2024 2106)  Skin Integrity Remains Intact: Monitor for areas of redness and/or skin breakdown

## 2024-09-01 NOTE — PROGRESS NOTES
recall that she fell prior to admission however question accuracy of events around fall.  -Able to recall that she \"injured her brain\"  -Accurate recall of children/grandchildren names    Insight to deficits/safety awareness   - Attempted to use safety picture cards for improve safety awareness, Pt impulsive with task and would flip through cards quickly despite cues  -Frequent redirection required as Pt would ask who the people were in the pictures  -Max cues required to verbalize problem noted in picture, pt often stated, \"I don't know\"  -Max cues required to state a solution to the problem  -Reduced insight into rationale for chair alarm, Pt aware that it goes off frequently however reduced carryover of rationale for alarm/general safety awareness    Sustained attention to session tasks  - pt was able to sustain attention in seated position for 20 minutes of the 30 minute session    Additional Interventions:             Education  Barriers To Learning: cognition  Patient Education: Provided education regarding role of SLP, results of assessment, recommendations and general speech pathology plan of care.   Learning Assessment: Pt requires ongoing learning   Pt with limited comprehension     Assessment  Impairments Requiring Therapeutic Intervention: Cognitive-Linguistic Deficits   Prognosis: good    Clinical Assessment:  Pt presents with severe cognitive linguistic deficits with most significant attention and memory impairments, in addition to deficits in orientation, problem solving, and judgement/ insight. Requires mod-max cues to redirect and attend to discussion on deficits due to limited insight. Her orientation to situation and basic temporal concepts appears to be improving. She requires frequent repetition for safety protocols (attempting to stand up to walk throughout session).  Pt's attention and memory also impact verbal expression, thought organization, and pragmatic/ social functioning. Recommend

## 2024-09-01 NOTE — PROGRESS NOTES
Recommendations: Home with HHOT and 24 hour supervision/assist     DME Required For Discharge: grab bars in shower      Precautions/Restrictions: high fall risk  Weight Bearing Restrictions: no restrictions   Required Braces/Orthotics: no braces required  Positional Restrictions:Spinal Precautions - No Bending, Lifting, Twisting no lifting >8 lbs     Pre-Admission Information   Lives With: spouse, dog (Adenike).  (Fausto)- works full time but reports he may retire soon   Type of Home: house  Home Layout: two level, 6 + 6  stairs to 2nd level with R and L HR  Home Access:  2 step to enter without rails   Bathroom Layout: walk in shower  Bathroom Equipment:  no equipment   Toilet Height: standard height  Home Equipment: no prior equipment  Transfer Assistance: Independent without use of device  Ambulation Assistance:Independent without use of device  ADL Assistance: independent with all ADL's  IADL Assistance: independent with homemaking tasks  Active :        [x] Yes  [] No  Hand Dominance: [] Left  [x] Right  Current Employment: retired.  Occupation:    Hobbies: "[employer]   Recent Falls: only 1 fall that led to this admission   Available Assistance at Discharge:  to be determined based off husbands work     Examination   Vision:   Vision Corrective Device: wears glasses for reading no issues during visual tracking   Denies vision changes but reports some burning.   Hearing:   WFL      Subjective  General: Pt up ambulating in room with PCA present upon arrival. Pt agreeable to therapy and showering with encouragement. Pt continues to be impulsive, restless and with decreased attention. Able to be re-directed.   Pain: 0/10    Pain Interventions: not applicable        Activities of Daily Living  Basic Activities of Daily Living  Grooming: stand by assistance  Grooming Comments: Pt completed brushing hair then used blow dryer to dry hair in stance with SBA for about 8-10 minutes total.

## 2024-09-01 NOTE — PROGRESS NOTES
Patient very anxious today, wanting to leave. Patient was also up most of the night via morning report, and has been restless and impulsive all day. Call placed to Dr. Guzman, new orders placed for xanax. Atarax d/c'd and benadryl ordered for rash.

## 2024-09-01 NOTE — PROGRESS NOTES
[facility] - Inpatient Rehabilitation Department   Phone: [phone]    Physical Therapy    [] Initial Evaluation            [x] Daily Treatment Note         [] Discharge Summary      Patient: Meche Bansal   : 1960   MRN: [id number]   Date of Service:  2024  Admitting Diagnosis: Traumatic subdural hemorrhage with loss of consciousness of unspecified duration, initial encounter ([facility])    Current Admission Summary: Meche Bansal is a 63 y.o. female with PMHx notable for alcohol use disorder who presented to [facility] on  after fall down approximately 6 stairs. + LOC. + ETOH.  CT head demonstrated hemorrhagic contusions involving the bilateral frontal lobes (right greater than left) and left cerebellar hemisphere, right sided SDH with 2 mm leftward midline shift, multifocal traumatic subarachnoid hemorrhage, nondisplaced left occipital bone fracture extending into left occipital condyle.  CTA revealed grade 1B CVI involving the distal ICA bilaterally, and a small 3 x 2 mm left ICA aneurysm.  She was transferred to [facility].  She was monitored on continuous EEG, which was significant for 1 definite and multiple possible focal electrographic seizures.     Past Medical History:  has no past medical history on file.  Past Surgical History:  has a past surgical history that includes Hysterectomy and Dilatation, esophagus.    Discharge Recommendations: HHPT with 24 hr supervision  DME Required For Discharge: no DME required at discharge  Precautions/Restrictions: high fall risk  Weight Bearing Restrictions: no restrictions     Required Braces/Orthotics: no braces required  Positional Restrictions:Spinal Precautions - No Bending, Lifting, Twisting >8#    Pre-Admission Information     Lives With: spouse, dog (Adenike).  (Fausto)- works full time but reports he may retire soon   Type of Home: house  Home Layout: two level, 6 + 6  stairs to 2nd level with R and L HR  Home Access:  2 step to enter without

## 2024-09-01 NOTE — PLAN OF CARE
Problem: Safety - Adult  Goal: Free from fall injury  9/1/2024 1018 by Clementina Walters RN  Outcome: Progressing  Note: Pt remains free from falls.  Safety precautions in place.  Bed in lowest position, bed/chair wheels locked, call light with in reach, bedside table in reach, bed/chair alarm on, fall risk wrist band on.

## 2024-09-01 NOTE — PROGRESS NOTES
Patient had c/o burning during urination and an increase in frequency. Urine sent, results negative.

## 2024-09-02 VITALS
TEMPERATURE: 97.9 F | HEIGHT: 63 IN | SYSTOLIC BLOOD PRESSURE: 135 MMHG | OXYGEN SATURATION: 100 % | HEART RATE: 77 BPM | RESPIRATION RATE: 16 BRPM | WEIGHT: 119 LBS | BODY MASS INDEX: 21.09 KG/M2 | DIASTOLIC BLOOD PRESSURE: 82 MMHG

## 2024-09-02 LAB
ANION GAP SERPL CALCULATED.3IONS-SCNC: 13 MMOL/L (ref 3–16)
BASOPHILS # BLD: 0 K/UL (ref 0–0.2)
BASOPHILS NFR BLD: 0.9 %
BUN SERPL-MCNC: 4 MG/DL (ref 7–20)
CALCIUM SERPL-MCNC: 8.9 MG/DL (ref 8.3–10.6)
CHLORIDE SERPL-SCNC: 109 MMOL/L (ref 99–110)
CO2 SERPL-SCNC: 22 MMOL/L (ref 21–32)
CREAT SERPL-MCNC: <0.5 MG/DL (ref 0.6–1.2)
DEPRECATED RDW RBC AUTO: 13.2 % (ref 12.4–15.4)
EOSINOPHIL # BLD: 0.1 K/UL (ref 0–0.6)
EOSINOPHIL NFR BLD: 1.5 %
GFR SERPLBLD CREATININE-BSD FMLA CKD-EPI: >90 ML/MIN/{1.73_M2}
GLUCOSE SERPL-MCNC: 101 MG/DL (ref 70–99)
HCT VFR BLD AUTO: 32.5 % (ref 36–48)
HGB BLD-MCNC: 11.1 G/DL (ref 12–16)
LYMPHOCYTES # BLD: 1.5 K/UL (ref 1–5.1)
LYMPHOCYTES NFR BLD: 38.6 %
MAGNESIUM SERPL-MCNC: 1.9 MG/DL (ref 1.8–2.4)
MCH RBC QN AUTO: 31.5 PG (ref 26–34)
MCHC RBC AUTO-ENTMCNC: 34.1 G/DL (ref 31–36)
MCV RBC AUTO: 92.4 FL (ref 80–100)
MONOCYTES # BLD: 0.6 K/UL (ref 0–1.3)
MONOCYTES NFR BLD: 16.4 %
NEUTROPHILS # BLD: 1.6 K/UL (ref 1.7–7.7)
NEUTROPHILS NFR BLD: 42.6 %
PLATELET # BLD AUTO: 245 K/UL (ref 135–450)
PMV BLD AUTO: 8.4 FL (ref 5–10.5)
POTASSIUM SERPL-SCNC: 3.4 MMOL/L (ref 3.5–5.1)
RBC # BLD AUTO: 3.51 M/UL (ref 4–5.2)
SODIUM SERPL-SCNC: 144 MMOL/L (ref 136–145)
WBC # BLD AUTO: 3.8 K/UL (ref 4–11)

## 2024-09-02 PROCEDURE — 83735 ASSAY OF MAGNESIUM: CPT

## 2024-09-02 PROCEDURE — 36415 COLL VENOUS BLD VENIPUNCTURE: CPT

## 2024-09-02 PROCEDURE — 6360000002 HC RX W HCPCS: Performed by: STUDENT IN AN ORGANIZED HEALTH CARE EDUCATION/TRAINING PROGRAM

## 2024-09-02 PROCEDURE — 80048 BASIC METABOLIC PNL TOTAL CA: CPT

## 2024-09-02 PROCEDURE — 1280000000 HC REHAB R&B

## 2024-09-02 PROCEDURE — 6370000000 HC RX 637 (ALT 250 FOR IP): Performed by: STUDENT IN AN ORGANIZED HEALTH CARE EDUCATION/TRAINING PROGRAM

## 2024-09-02 PROCEDURE — 6370000000 HC RX 637 (ALT 250 FOR IP): Performed by: PHYSICAL MEDICINE & REHABILITATION

## 2024-09-02 PROCEDURE — 85025 COMPLETE CBC W/AUTO DIFF WBC: CPT

## 2024-09-02 RX ORDER — GAUZE BANDAGE 2" X 2"
100 BANDAGE TOPICAL DAILY
Status: DISPENSED | OUTPATIENT
Start: 2024-09-02

## 2024-09-02 RX ORDER — POTASSIUM CHLORIDE 1500 MG/1
20 TABLET, EXTENDED RELEASE ORAL 2 TIMES DAILY WITH MEALS
Status: DISPENSED | OUTPATIENT
Start: 2024-09-02 | End: 2024-09-05

## 2024-09-02 RX ORDER — TRAZODONE HYDROCHLORIDE 50 MG/1
100 TABLET, FILM COATED ORAL NIGHTLY PRN
Status: DISPENSED | OUTPATIENT
Start: 2024-09-02

## 2024-09-02 RX ORDER — ALPRAZOLAM 0.5 MG
0.5 TABLET ORAL 3 TIMES DAILY PRN
Status: ACTIVE | OUTPATIENT
Start: 2024-09-02

## 2024-09-02 RX ADMIN — TRAZODONE HYDROCHLORIDE 100 MG: 50 TABLET ORAL at 21:39

## 2024-09-02 RX ADMIN — THERA TABS 1 TABLET: TAB at 08:14

## 2024-09-02 RX ADMIN — PROPRANOLOL HYDROCHLORIDE 10 MG: 20 TABLET ORAL at 08:15

## 2024-09-02 RX ADMIN — ALPRAZOLAM 0.25 MG: 0.25 TABLET ORAL at 08:15

## 2024-09-02 RX ADMIN — DIPHENHYDRAMINE HYDROCHLORIDE 25 MG: 25 TABLET ORAL at 17:31

## 2024-09-02 RX ADMIN — ENOXAPARIN SODIUM 40 MG: 100 INJECTION SUBCUTANEOUS at 08:15

## 2024-09-02 RX ADMIN — HYDROCORTISONE: 0.01 CREAM TOPICAL at 21:38

## 2024-09-02 RX ADMIN — ACETAMINOPHEN 650 MG: 325 TABLET ORAL at 08:13

## 2024-09-02 RX ADMIN — POTASSIUM CHLORIDE 20 MEQ: 1500 TABLET, EXTENDED RELEASE ORAL at 17:31

## 2024-09-02 RX ADMIN — HYDROCORTISONE: 0.01 CREAM TOPICAL at 08:15

## 2024-09-02 RX ADMIN — PROPRANOLOL HYDROCHLORIDE 10 MG: 20 TABLET ORAL at 21:38

## 2024-09-02 RX ADMIN — POLYVINYL ALCOHOL, POVIDONE 1 DROP: 14; 6 SOLUTION/ DROPS OPHTHALMIC at 17:31

## 2024-09-02 RX ADMIN — PROPRANOLOL HYDROCHLORIDE 10 MG: 20 TABLET ORAL at 14:34

## 2024-09-02 RX ADMIN — Medication 100 MG: at 14:34

## 2024-09-02 ASSESSMENT — PAIN SCALES - GENERAL
PAINLEVEL_OUTOF10: 0
PAINLEVEL_OUTOF10: 6
PAINLEVEL_OUTOF10: 3

## 2024-09-02 ASSESSMENT — PAIN DESCRIPTION - INTENSITY: RATING_2: 6

## 2024-09-02 ASSESSMENT — PAIN DESCRIPTION - DESCRIPTORS
DESCRIPTORS: ACHING
DESCRIPTORS_2: ACHING;SORE

## 2024-09-02 ASSESSMENT — PAIN DESCRIPTION - ORIENTATION
ORIENTATION: LEFT
ORIENTATION_2: RIGHT;LEFT;POSTERIOR

## 2024-09-02 ASSESSMENT — PAIN DESCRIPTION - FREQUENCY: FREQUENCY: CONTINUOUS

## 2024-09-02 ASSESSMENT — PAIN - FUNCTIONAL ASSESSMENT
PAIN_FUNCTIONAL_ASSESSMENT_SITE2: ACTIVITIES ARE NOT PREVENTED
PAIN_FUNCTIONAL_ASSESSMENT: ACTIVITIES ARE NOT PREVENTED

## 2024-09-02 ASSESSMENT — PAIN DESCRIPTION - LOCATION
LOCATION_2: LEG
LOCATION: HEAD

## 2024-09-02 ASSESSMENT — PAIN DESCRIPTION - ONSET: ONSET: GRADUAL

## 2024-09-02 ASSESSMENT — PAIN DESCRIPTION - PAIN TYPE: TYPE: ACUTE PAIN

## 2024-09-02 NOTE — PROGRESS NOTES
Meche Bansal  9/2/2024  [id number]    Chief Complaint: Traumatic subdural hemorrhage with loss of consciousness of unspecified duration, initial encounter ([facility])    Subjective:   Seen in her room this morning with her .  She is asking for eyedrops/artificial tears today.  She did have some delirium overnight and does have a lack of sleep.  Medication changes made to electrolyte replacement.  She has no new pain this morning   No chills, chest pain, dyspnea. Denies any dysuria.     Last BM: Stool Occurrence: 0 (09/02/24 0708)    Objective:  Patient Vitals for the past 24 hrs:   BP Temp Temp src Pulse Resp SpO2   09/02/24 0811 125/77 97.5 °F (36.4 °C) Axillary 85 16 99 %   09/01/24 2200 (!) 152/79 98.7 °F (37.1 °C) Oral 80 16 98 %     Gen: No distress, pleasant.   HEENT: Normocephalic, atraumatic.   CV: Regular rate and rhythm. Extremities warm, well perfused.   Resp: No respiratory distress. CTAB.  Abd: Soft, nontender  Ext: No edema.   Skin: Diffuse papular rash w/ excoriations involving back, stable from prior.   Neuro: Alert, oriented to person and place. Restless, pacing the room.     Laboratory data: Available via EMR.     Therapy progress:       PT    Supine to Sit: Independent  Sit to Supine: Supervision or touching assistance   Sit to Stand: Supervision or touching assistance  Chair/Bed to Chair Transfer: Partial/moderate assistance  Car Transfer: Supervision or touching assistance  Ambulation 10 ft: Supervision or touching assistance  Ambulation 50 ft: Supervision or touching assistance  Ambulation 150 ft: Supervision or touching assistance  Stairs - 1 Step: Supervision or touching assistance  Stairs - 4 Step: Supervision or touching assistance  Stairs - 12 Step: Supervision or touching assistance    OT    Eating: Setup or clean-up assistance  Oral Hygiene: Supervision or touching assistance  Bathing: Supervision or touching assistance  Upper Body Dressing: Supervision or touching assistance  Lower Body

## 2024-09-03 PROCEDURE — 97530 THERAPEUTIC ACTIVITIES: CPT

## 2024-09-03 PROCEDURE — 6370000000 HC RX 637 (ALT 250 FOR IP): Performed by: STUDENT IN AN ORGANIZED HEALTH CARE EDUCATION/TRAINING PROGRAM

## 2024-09-03 PROCEDURE — 97110 THERAPEUTIC EXERCISES: CPT

## 2024-09-03 PROCEDURE — 97129 THER IVNTJ 1ST 15 MIN: CPT

## 2024-09-03 PROCEDURE — 97130 THER IVNTJ EA ADDL 15 MIN: CPT

## 2024-09-03 PROCEDURE — 97112 NEUROMUSCULAR REEDUCATION: CPT

## 2024-09-03 PROCEDURE — 1280000000 HC REHAB R&B

## 2024-09-03 PROCEDURE — 6370000000 HC RX 637 (ALT 250 FOR IP): Performed by: PHYSICAL MEDICINE & REHABILITATION

## 2024-09-03 PROCEDURE — 97535 SELF CARE MNGMENT TRAINING: CPT

## 2024-09-03 PROCEDURE — 97116 GAIT TRAINING THERAPY: CPT

## 2024-09-03 RX ORDER — BENZOCAINE/MENTHOL 6 MG-10 MG
LOZENGE MUCOUS MEMBRANE 2 TIMES DAILY
Status: DISCONTINUED | OUTPATIENT
Start: 2024-09-03 | End: 2024-09-03

## 2024-09-03 RX ORDER — DIPHENHYDRAMINE HCL 25 MG
50 TABLET ORAL EVERY 8 HOURS PRN
Status: DISCONTINUED | OUTPATIENT
Start: 2024-09-03 | End: 2024-09-05 | Stop reason: HOSPADM

## 2024-09-03 RX ORDER — PROPRANOLOL HYDROCHLORIDE 20 MG/1
20 TABLET ORAL 3 TIMES DAILY
Status: DISCONTINUED | OUTPATIENT
Start: 2024-09-03 | End: 2024-09-05 | Stop reason: HOSPADM

## 2024-09-03 RX ORDER — LANOLIN ALCOHOL/MO/W.PET/CERES
3 CREAM (GRAM) TOPICAL EVERY EVENING
Status: DISCONTINUED | OUTPATIENT
Start: 2024-09-03 | End: 2024-09-05 | Stop reason: HOSPADM

## 2024-09-03 RX ORDER — TRIAMCINOLONE ACETONIDE 1 MG/G
CREAM TOPICAL 2 TIMES DAILY
Status: DISCONTINUED | OUTPATIENT
Start: 2024-09-03 | End: 2024-09-05 | Stop reason: HOSPADM

## 2024-09-03 RX ADMIN — PROPRANOLOL HYDROCHLORIDE 20 MG: 20 TABLET ORAL at 20:38

## 2024-09-03 RX ADMIN — THERA TABS 1 TABLET: TAB at 09:00

## 2024-09-03 RX ADMIN — TRIAMCINOLONE ACETONIDE: 1 CREAM TOPICAL at 20:38

## 2024-09-03 RX ADMIN — DIPHENHYDRAMINE HYDROCHLORIDE 50 MG: 25 TABLET ORAL at 17:17

## 2024-09-03 RX ADMIN — ALPRAZOLAM 0.5 MG: 0.5 TABLET ORAL at 00:08

## 2024-09-03 RX ADMIN — DIPHENHYDRAMINE HYDROCHLORIDE 25 MG: 25 TABLET ORAL at 09:00

## 2024-09-03 RX ADMIN — TRAZODONE HYDROCHLORIDE 100 MG: 50 TABLET ORAL at 20:38

## 2024-09-03 RX ADMIN — ACETAMINOPHEN 325MG 650 MG: 325 TABLET ORAL at 13:16

## 2024-09-03 RX ADMIN — MELATONIN TAB 3 MG 3 MG: 3 TAB at 17:18

## 2024-09-03 RX ADMIN — HYDROCORTISONE: 0.01 CREAM TOPICAL at 13:17

## 2024-09-03 ASSESSMENT — PAIN SCALES - GENERAL
PAINLEVEL_OUTOF10: 0
PAINLEVEL_OUTOF10: 1
PAINLEVEL_OUTOF10: 0
PAINLEVEL_OUTOF10: 7
PAINLEVEL_OUTOF10: 1

## 2024-09-03 ASSESSMENT — PAIN DESCRIPTION - LOCATION: LOCATION: BACK;HEAD

## 2024-09-03 ASSESSMENT — PAIN DESCRIPTION - DESCRIPTORS: DESCRIPTORS: ACHING

## 2024-09-03 NOTE — PROGRESS NOTES
Her orientation to situation and basic temporal concepts appears to be improving. She requires frequent redirection for comprehension of safety parameters relative to her TBI.  Pt's attention and memory also impact verbal expression, thought organization, and pragmatic/ social functioning. Recommend skilled SLP intervention for safe return to prior level of  independence and maximize cognitive linguistic function for safety upon discharge.    Diet Solids Recommendation:   Liquid Consistency Recommendation:   Recommended Form of Meds:   Regular texture diet     Thin liquids     Meds whole with water           Plan  Frequency: 60 minutes/day; 5 days per week, as tolerated, until goals met, or discharged from ARU.  Therapeutic Interventions: Patient/Family Education , Therapeutic Trials with SLP  Cognitive-Linguistic intervention     Discharge  Barriers to discharge: May need additional assistance to manage medications and/or finances (assistance/supervision)  Inability to effectively communicate in emergent situations (ex: calling 911, stating name, reduced intelligibility, etc)  Inability to communicate or demonstrate problem solving due to cognitive-communicative impairment  Cognitive deficits with reduced insight negatively impacting safety/independence  Discharge Recommendations: Home with assistance and 24 hour supervision  Continued SLP at Discharge: Yes     Goals  Patient Goals: To return back to her normal   Time Frame: 12-15 days    Patient will demonstrate insight into limitations and impact on daily functioning with min cues.  Patient will complete functional problem-solving tasks for daily situations with 80% accuracy or given min cues.  Pt will improve attention to detail for graded complex information to 80%, for improved recall and retention of newly learned information   Pt will complete word associative tasks to improve mental flexibility, complex thinking, and working memory skills with 80% accuracy.

## 2024-09-03 NOTE — PROGRESS NOTES
[facility] - Inpatient Rehabilitation Department   Phone: [phone]    Occupational Therapy    [] Initial Evaluation            [x] Daily Treatment Note         [] Discharge Summary      Patient: Meche Bansal   : 1960   MRN: [id number]   Date of Service:  9/3/2024    Admitting Diagnosis:  Traumatic subdural hemorrhage with loss of consciousness of unspecified duration, initial encounter ([facility])  Current Admission Summary:   Meche Bansal is a 63 y.o. female with PMHx notable for alcohol use disorder who presented to [facility] on  after fall down approximately 6 stairs. + LOC. + ETOH. GCS 11 on arrival. CT head demonstrated hemorrhagic contusions involving the bilateral frontal lobes (right greater than left) and left cerebellar hemisphere, right sided SDH with 2 mm leftward midline shift, multifocal traumatic subarachnoid hemorrhage, nondisplaced left occipital bone fracture extending into left occipital condyle.  CTA revealed grade 1 BCVI involving the distal ICA bilaterally, and a small 3 x 2 mm left ICA aneurysm.  She was transferred to [facility].  She was also found to have an age-indeterminate T7 compression fracture for which Ortho Spine was consulted, but did not recommend any acute surgical intervention. She was monitored on continuous EEG, which was significant for 1 definite and multiple possible focal electrographic seizures.  She failed swallow study and had NG tube placed for enteral nutrition.  She required multiple hypertonic saline boluses for hyponatremia, as well as electrolyte repletion for refeeding syndrome.  Her swallow function eventually improved and she is now tolerating PO diet. Hospital course was otherwise complicated by agitation requiring Precedex and intermittent use of nonviolent restraints.   Past Medical History:  has no past medical history on file.  Past Surgical History:  has a past surgical history that includes Hysterectomy and Dilatation, esophagus.    Discharge

## 2024-09-03 NOTE — CARE COORDINATION
KELLY faxed all orders, facesheet, H&P, therapy and doctor notes to [facility] to get patient approval for [facility] services from [facility].  Faxed to [phone].  Waiting on an answer back that patient is approved.    Electronically signed by ANAHI Zabala, LSW on 9/3/2024 at 4:18 PM

## 2024-09-03 NOTE — PROGRESS NOTES
Meche Bansal  9/3/2024  [id number]    Chief Complaint: Traumatic subdural hemorrhage with loss of consciousness of unspecified duration, initial encounter ([facility])    Subjective:   Received Xanax PRN last evening for anxiety.    She continues to have diffuse pruritus, worst involving her back but also noting this in her right arm and ankles. She isn't sure PRN Benadryl is helping at the current dose, notes that she takes 50 mg at home and tolerates this. She also has some midthoracic paraspinal pain, agreeable to trying some ice for this. She has been asking her  for alcohol frequently.     Last BM: Stool Occurrence: 0 (09/02/24 0708)    Objective:  Patient Vitals for the past 24 hrs:   BP Temp Temp src Pulse Resp SpO2 Height Weight   09/03/24 1445 -- -- -- -- -- -- 1.6 m (5' 2.99\") 53.5 kg (117 lb 14.4 oz)   09/03/24 1006 -- -- -- -- -- -- 1.6 m (5' 2.99\") --   09/03/24 0845 107/67 98.5 °F (36.9 °C) Oral 82 18 99 % -- --   09/02/24 2130 135/82 97.9 °F (36.6 °C) Oral 77 16 100 % -- --     Gen: No distress, pleasant.   HEENT: Normocephalic, atraumatic.   CV: Regular rate and rhythm. Extremities warm, well perfused.   Resp: No respiratory distress. CTAB.  Abd: Soft, nontender  Ext: No edema.   Skin: Diffuse papular rash w/ excoriations involving back, stable from prior.   Neuro: Alert, oriented to person and place. Restless, pacing the room.     Laboratory data: Available via EMR.     Therapy progress:       PT    Supine to Sit: Independent  Sit to Supine: Supervision or touching assistance   Sit to Stand: Supervision or touching assistance  Chair/Bed to Chair Transfer: Partial/moderate assistance  Car Transfer: Supervision or touching assistance  Ambulation 10 ft: Supervision or touching assistance  Ambulation 50 ft: Supervision or touching assistance  Ambulation 150 ft: Supervision or touching assistance  Stairs - 1 Step: Supervision or touching assistance  Stairs - 4 Step: Supervision or touching

## 2024-09-03 NOTE — PLAN OF CARE
Problem: Discharge Planning  Goal: Discharge to home or other facility with appropriate resources  Outcome: Progressing     Problem: Safety - Adult  Goal: Free from fall injury  Outcome: Progressing     Problem: Nutrition Deficit:  Goal: Optimize nutritional status  Outcome: Progressing     Problem: Pain  Goal: Verbalizes/displays adequate comfort level or baseline comfort level  Outcome: Progressing     Problem: Skin/Tissue Integrity - Adult  Goal: Skin integrity remains intact  Outcome: Progressing

## 2024-09-03 NOTE — PLAN OF CARE
Meche Bansal will require the following home care treatments or therapies: PT/OT/SLP/RN/HHA. Home care will be necessary because of TBI causing cognitive deficits, impaired balance and coordination, pain limiting mobility and self-care. The patient is in agreement to receiving home care.     Panfilo Pond MD 09/03/24 3:20 PM

## 2024-09-03 NOTE — PROGRESS NOTES
Nutrition Note    RECOMMENDATIONS  PO Diet: regular, no caffeine (caffeine allergy)  ONS: Ensure Plus High Protein TID      ASSESSMENT   Pt continues on a regular diet and reports good appetite and eating well.  PO intake recorded has been 76 - 100% over past few meals.  Pt is receiving Ensure Plus High Protein and reports consuming the supplement between meals.  No current weight to evaluate.  Will continue to monitor po intake throughout admission and order new weight.       Malnutrition Status: No malnutrition      NUTRITION DIAGNOSIS   Increased nutrient needs related to increase demand for energy/nutrients as evidenced by in ARU for strength and condititioning    Goals: PO intake 50% or greater, by next RD assessment     NUTRITION RELATED FINDINGS  Objective: 8/28 LBM- senna and glycolax ordered  Wounds: None    CURRENT NUTRITION THERAPIES  ADULT ORAL NUTRITION SUPPLEMENT; Breakfast, Lunch, Dinner; Standard High Calorie/High Protein Oral Supplement  ADULT DIET; Regular; No Caffeine     PO Intake: %   PO Supplement Intake:51-75%      ANTHROPOMETRICS  Current Height: 160 cm (5' 2.99\")  Current Weight - Scale: 54 kg (119 lb) - admission wt   Ideal Body Weight (IBW): 115 lbs  (52 kg)    Usual Bodyweight 55.8 kg (123 lb) (per pt)       BMI: 21.1      COMPARATIVE STANDARDS  Total Energy Requirements (kcals/day): 1501     Protein (g):  56 - 67       Fluid (mL/day):  1501    EDUCATION  Education not indicated     The patient will be monitored per nutrition standards of care. Consult dietitian if additional nutrition interventions are needed prior to RD reassessment.     LETICIA VIZCARRA, RD, LD    Contact: [phone]

## 2024-09-03 NOTE — PLAN OF CARE
Problem: Discharge Planning  Goal: Discharge to home or other facility with appropriate resources  9/3/2024 1425 by Madeleine Rodriguez RN  Outcome: Progressing  Flowsheets (Taken 9/3/2024 0845)  Discharge to home or other facility with appropriate resources: Identify barriers to discharge with patient and caregiver     Problem: Safety - Adult  Goal: Free from fall injury  9/3/2024 1425 by Madeleine Rodriguez RN  Outcome: Progressing     Problem: Nutrition Deficit:  Goal: Optimize nutritional status  9/3/2024 1425 by Madeleine Rodriguez RN  Outcome: Progressing  Flowsheets (Taken 9/3/2024 1006 by Allie Rocha, RD, LD)  Nutrient intake appropriate for improving, restoring, or maintaining nutritional needs: Monitor oral intake, labs, and treatment plans     Problem: Pain  Goal: Verbalizes/displays adequate comfort level or baseline comfort level  9/3/2024 1425 by Madeleine Rodriguez RN  Outcome: Progressing     Problem: Skin/Tissue Integrity - Adult  Goal: Skin integrity remains intact  9/3/2024 1425 by Madeleine Rodriguez RN  Outcome: Progressing  Flowsheets  Taken 9/3/2024 1424  Skin Integrity Remains Intact: Monitor for areas of redness and/or skin breakdown  Taken 9/3/2024 0845  Skin Integrity Remains Intact: Monitor for areas of redness and/or skin breakdown

## 2024-09-03 NOTE — PROGRESS NOTES
[facility] - Inpatient Rehabilitation Department   Phone: [phone]    Physical Therapy    [] Initial Evaluation            [x] Daily Treatment Note         [] Discharge Summary      Patient: Meche Bansal   : 1960   MRN: [id number]   Date of Service:  9/3/2024  Admitting Diagnosis: Traumatic subdural hemorrhage with loss of consciousness of unspecified duration, initial encounter ([facility])    Current Admission Summary: Meche Bansal is a 63 y.o. female with PMHx notable for alcohol use disorder who presented to [facility] on  after fall down approximately 6 stairs. + LOC. + ETOH.  CT head demonstrated hemorrhagic contusions involving the bilateral frontal lobes (right greater than left) and left cerebellar hemisphere, right sided SDH with 2 mm leftward midline shift, multifocal traumatic subarachnoid hemorrhage, nondisplaced left occipital bone fracture extending into left occipital condyle.  CTA revealed grade 1B CVI involving the distal ICA bilaterally, and a small 3 x 2 mm left ICA aneurysm.  She was transferred to [facility].  She was monitored on continuous EEG, which was significant for 1 definite and multiple possible focal electrographic seizures.     Past Medical History:  has no past medical history on file.  Past Surgical History:  has a past surgical history that includes Hysterectomy and Dilatation, esophagus.    Discharge Recommendations: HHPT with 24 hr supervision  DME Required For Discharge: DME to be determined pending patient progress  Precautions/Restrictions: high fall risk  Weight Bearing Restrictions: no restrictions     Required Braces/Orthotics: no braces required  Positional Restrictions:Spinal Precautions - No Bending, Twisting, Lifting > 8 lbs    Pre-Admission Information     Lives With: spouse, dog (Adenike).  (Fausto)- works full time but reports he may retire soon   Type of Home: house  Home Layout: two level, 6 + 6  stairs to 2nd level with R and L HR  Home Access:  2

## 2024-09-04 LAB
ANION GAP SERPL CALCULATED.3IONS-SCNC: 11 MMOL/L (ref 3–16)
BASOPHILS # BLD: 0 K/UL (ref 0–0.2)
BASOPHILS NFR BLD: 0.4 %
BUN SERPL-MCNC: 3 MG/DL (ref 7–20)
CALCIUM SERPL-MCNC: 9 MG/DL (ref 8.3–10.6)
CHLORIDE SERPL-SCNC: 108 MMOL/L (ref 99–110)
CO2 SERPL-SCNC: 23 MMOL/L (ref 21–32)
CREAT SERPL-MCNC: 0.5 MG/DL (ref 0.6–1.2)
DEPRECATED RDW RBC AUTO: 13 % (ref 12.4–15.4)
EOSINOPHIL # BLD: 0.1 K/UL (ref 0–0.6)
EOSINOPHIL NFR BLD: 1 %
GFR SERPLBLD CREATININE-BSD FMLA CKD-EPI: >90 ML/MIN/{1.73_M2}
GLUCOSE SERPL-MCNC: 123 MG/DL (ref 70–99)
HCT VFR BLD AUTO: 32.5 % (ref 36–48)
HGB BLD-MCNC: 11 G/DL (ref 12–16)
LYMPHOCYTES # BLD: 1.2 K/UL (ref 1–5.1)
LYMPHOCYTES NFR BLD: 21.1 %
MAGNESIUM SERPL-MCNC: 1.8 MG/DL (ref 1.8–2.4)
MCH RBC QN AUTO: 31.3 PG (ref 26–34)
MCHC RBC AUTO-ENTMCNC: 33.7 G/DL (ref 31–36)
MCV RBC AUTO: 93.1 FL (ref 80–100)
MONOCYTES # BLD: 0.7 K/UL (ref 0–1.3)
MONOCYTES NFR BLD: 12.3 %
NEUTROPHILS # BLD: 3.9 K/UL (ref 1.7–7.7)
NEUTROPHILS NFR BLD: 65.2 %
PLATELET # BLD AUTO: 207 K/UL (ref 135–450)
PMV BLD AUTO: 8.9 FL (ref 5–10.5)
POTASSIUM SERPL-SCNC: 3.4 MMOL/L (ref 3.5–5.1)
RBC # BLD AUTO: 3.49 M/UL (ref 4–5.2)
SODIUM SERPL-SCNC: 142 MMOL/L (ref 136–145)
WBC # BLD AUTO: 5.9 K/UL (ref 4–11)

## 2024-09-04 PROCEDURE — 1280000000 HC REHAB R&B

## 2024-09-04 PROCEDURE — 97110 THERAPEUTIC EXERCISES: CPT

## 2024-09-04 PROCEDURE — 97116 GAIT TRAINING THERAPY: CPT

## 2024-09-04 PROCEDURE — 97129 THER IVNTJ 1ST 15 MIN: CPT

## 2024-09-04 PROCEDURE — 97530 THERAPEUTIC ACTIVITIES: CPT

## 2024-09-04 PROCEDURE — 83735 ASSAY OF MAGNESIUM: CPT

## 2024-09-04 PROCEDURE — 80048 BASIC METABOLIC PNL TOTAL CA: CPT

## 2024-09-04 PROCEDURE — 6370000000 HC RX 637 (ALT 250 FOR IP): Performed by: PHYSICAL MEDICINE & REHABILITATION

## 2024-09-04 PROCEDURE — 97130 THER IVNTJ EA ADDL 15 MIN: CPT

## 2024-09-04 PROCEDURE — 97535 SELF CARE MNGMENT TRAINING: CPT

## 2024-09-04 PROCEDURE — 99232 SBSQ HOSP IP/OBS MODERATE 35: CPT

## 2024-09-04 PROCEDURE — 85025 COMPLETE CBC W/AUTO DIFF WBC: CPT

## 2024-09-04 PROCEDURE — 6370000000 HC RX 637 (ALT 250 FOR IP): Performed by: STUDENT IN AN ORGANIZED HEALTH CARE EDUCATION/TRAINING PROGRAM

## 2024-09-04 RX ADMIN — TRIAMCINOLONE ACETONIDE: 1 CREAM TOPICAL at 08:56

## 2024-09-04 RX ADMIN — ACETAMINOPHEN 325MG 650 MG: 325 TABLET ORAL at 04:14

## 2024-09-04 RX ADMIN — DIPHENHYDRAMINE HYDROCHLORIDE 50 MG: 25 TABLET ORAL at 04:14

## 2024-09-04 RX ADMIN — POLYVINYL ALCOHOL, POVIDONE 1 DROP: 14; 6 SOLUTION/ DROPS OPHTHALMIC at 08:55

## 2024-09-04 RX ADMIN — THERA TABS 1 TABLET: TAB at 08:55

## 2024-09-04 RX ADMIN — MELATONIN TAB 3 MG 3 MG: 3 TAB at 18:57

## 2024-09-04 RX ADMIN — TRAZODONE HYDROCHLORIDE 100 MG: 50 TABLET ORAL at 20:40

## 2024-09-04 RX ADMIN — PROPRANOLOL HYDROCHLORIDE 20 MG: 20 TABLET ORAL at 08:55

## 2024-09-04 RX ADMIN — TRIAMCINOLONE ACETONIDE: 1 CREAM TOPICAL at 20:45

## 2024-09-04 RX ADMIN — PROPRANOLOL HYDROCHLORIDE 20 MG: 20 TABLET ORAL at 14:14

## 2024-09-04 RX ADMIN — PROPRANOLOL HYDROCHLORIDE 20 MG: 20 TABLET ORAL at 20:40

## 2024-09-04 RX ADMIN — DIPHENHYDRAMINE HYDROCHLORIDE 50 MG: 25 TABLET ORAL at 18:57

## 2024-09-04 ASSESSMENT — PAIN SCALES - GENERAL
PAINLEVEL_OUTOF10: 3
PAINLEVEL_OUTOF10: 6
PAINLEVEL_OUTOF10: 8
PAINLEVEL_OUTOF10: 0
PAINLEVEL_OUTOF10: 5
PAINLEVEL_OUTOF10: 6

## 2024-09-04 ASSESSMENT — PAIN DESCRIPTION - DESCRIPTORS
DESCRIPTORS: THROBBING;SHOOTING
DESCRIPTORS: BURNING
DESCRIPTORS: CRAMPING
DESCRIPTORS: THROBBING

## 2024-09-04 ASSESSMENT — PAIN DESCRIPTION - ORIENTATION
ORIENTATION: RIGHT;LEFT;ANTERIOR
ORIENTATION: LEFT;RIGHT
ORIENTATION: RIGHT;LEFT
ORIENTATION: RIGHT;LEFT

## 2024-09-04 ASSESSMENT — PAIN DESCRIPTION - LOCATION
LOCATION: KNEE
LOCATION: LEG
LOCATION: KNEE
LOCATION: LEG

## 2024-09-04 ASSESSMENT — PAIN SCALES - WONG BAKER: WONGBAKER_NUMERICALRESPONSE: NO HURT

## 2024-09-04 ASSESSMENT — PAIN DESCRIPTION - PAIN TYPE
TYPE: ACUTE PAIN
TYPE: CHRONIC PAIN
TYPE: CHRONIC PAIN

## 2024-09-04 ASSESSMENT — PAIN DESCRIPTION - ONSET
ONSET: SUDDEN
ONSET: ON-GOING
ONSET: ON-GOING

## 2024-09-04 ASSESSMENT — PAIN - FUNCTIONAL ASSESSMENT
PAIN_FUNCTIONAL_ASSESSMENT: ACTIVITIES ARE NOT PREVENTED

## 2024-09-04 ASSESSMENT — PAIN DESCRIPTION - FREQUENCY
FREQUENCY: CONTINUOUS
FREQUENCY: INTERMITTENT
FREQUENCY: INTERMITTENT

## 2024-09-04 NOTE — PROGRESS NOTES
indicating a decreased risk of falling  - Goal Met 9/4/24    Above goals reviewed on 9/4/2024.  All goals are ongoing at this time unless indicated above.      Therapy Session Time    1st Session Time:    Individual Group Co-treatment   Time In 1045       Time Out 1115       Minutes 30          2nd Session Time:    Individual Group Co-treatment   Time In  1315       Time Out  1345       Minutes  30         Total Treatment Minutes: 60 Minutes      Electronically Signed By: Saloni Toribio SPT  Therapist observed and directed the patient's plan of care.  Co-signed and supervised by: Jelani Saleem PT, DPT - [id number]

## 2024-09-04 NOTE — PROGRESS NOTES
09/04/2024 05:43 AM   No results found for: \"INR\", \"PROTIME\"    Neuroimaging was independently reviewed by me and discussed results with the patient  I reviewed blood testing and other test results and discussed results with the patient      Impression:    New onset diffuse pruritic rash.  Likely drug eruption rash  Recent TBI  Chronic alcohol abuse      Recommendation    Continue to monitor rash closely and risk for SJS.  Seizure precautions  Continue Benadryl and steroid ointment  Lengthy discussion with the patient and family regarding following up with her primary care once discharged.    May need to follow-up with dermatology outpatient  We will follow from periphery.  Please call with questions  Discussed with .      DINA Fuentes, APRN - CNP        This dictation was generated by voice recognition computer software. Although all attempts are made to edit the dictation for accuracy, there may be errors in the transcription that are not intended.

## 2024-09-04 NOTE — PATIENT CARE CONFERENCE
[facility] Inpatient Rehabilitation Department  Weekly Team Conference Note    Patient Name: Meche Bansal      MRN: [id number]  : 1960  (63 y.o.) Gender: female     The team conference for this patient was held on 24 at 1100 am and was led by:  Panfilo Pond MD     CASE MANAGEMENT:  Assessment:  Patient is ready for discharge today from [facility].  SW received a phone call from Mouna at [employer] that works with [employer] insurance stating that they received all the necessary information to provide authorization for patient to received [facility] services through [facility] [employer] [facility].  Called Jefe and confirmed he has everything he needs as well.  Spouse has all necessary information for follow up needs in the community for Adult Day Care and ETOH resources if needed in the future.  SW will also be emailing spouse later today to remind him of the follow up appointments needed for patient and which ones that have been recommended for him to schedule.  All needs met per case management for discharge today.    PHYSICAL THERAPY    Current Status:  Bed Mobility:  Supine to Sit: Independent  Sit to Supine: Independent  Rolling Left: Independent  Rolling Right: Independent  Scooting: Independent  Bridging: Independent  Comments: Completed in therapy room - HOB flat and no use of bed rails.   Transfers:  Sit to stand transfer: Independent  Stand to sit transfer: Independent  Stand step transfer: Independent  Toilet transfer: Independent  Car transfer: Independent   Comments: All transfers completed without an AD. Multiple sit <=> stand transfers completed within session at various heights and surfaces.   Ambulation:  Surface:level surface  Assistive Device: no device  Assistance: Independent  Distance: 270' + 200' (max distance not attempted) + multiple short distances within session.   Gait Mechanics: mild path deviation, narrow CATE, equal step length/height  Comments: No

## 2024-09-04 NOTE — PROGRESS NOTES
orientation to situation and basic temporal concepts has improved however working memory and attention deficits contribute to reduced safety awareness thus requiring frequent redirection for comprehension of safety parameters relative to her TBI.  Pt's attention and memory also impact verbal expression, thought organization, and pragmatic/ social functioning. Recommend 24 hour supervision upon discharge and ongoing skilled SLP intervention for return to prior level of function and to maximize safety and independence in home setting.     Diet Solids Recommendation:   Liquid Consistency Recommendation:   Recommended Form of Meds:   Regular texture diet     Thin liquids     Meds whole with water           Plan  Frequency: 60 minutes/day; 5 days per week, as tolerated, until goals met, or discharged from ARU.  Therapeutic Interventions: Patient/Family Education , Therapeutic Trials with SLP  Cognitive-Linguistic intervention     Discharge  Barriers to discharge: May need additional assistance to manage medications and/or finances (assistance/supervision)  Inability to effectively communicate in emergent situations (ex: calling 911, stating name, reduced intelligibility, etc)  Inability to communicate or demonstrate problem solving due to cognitive-communicative impairment  Cognitive deficits with reduced insight negatively impacting safety/independence  Discharge Recommendations: Home with assistance and 24 hour supervision  Continued SLP at Discharge: Yes     Goals  Patient Goals: To return back to her normal   Time Frame: 12-15 days    Patient will demonstrate insight into limitations and impact on daily functioning with min cues. GOAL NOT MET   Patient will complete functional problem-solving tasks for daily situations with 80% accuracy or given min cues. GOAL NOT MET   Pt will improve attention to detail for graded complex information to 80%, for improved recall and retention of newly learned information GOAL NOT

## 2024-09-04 NOTE — PLAN OF CARE
Problem: Drug Abuse/Detox  Goal: Will have no detox symptoms and will verbalize plan for changing drug-related behavior  Description: INTERVENTIONS:  1. Administer medication as ordered  2. Monitor physical status  3. Provide emotional support with 1:1 interaction with staff  4. Encourage  recovery focused treatment   Outcome: Progressing  Flowsheets (Taken 9/3/2024 2137)  Will have no detox symptoms and will verbalize plan for changing drug-related behavior:   Administer medication as ordered   Monitor physical status   Encourage recovery focused treatment   Provide emotional support with 1:1 interaction with staff     Problem: Safety - Adult  Goal: Free from fall injury  9/3/2024 2137 by Rosalinda Crouch, RN  Outcome: Progressing  9/3/2024 1425 by Madeleine Rodriguez, RN  Outcome: Progressing     Problem: Discharge Planning  Goal: Discharge to home or other facility with appropriate resources  9/3/2024 2137 by Rosalinda Crouch, RN  Outcome: Progressing  9/3/2024 1425 by Madeleine Rodriguez, RN  Outcome: Progressing  Flowsheets (Taken 9/3/2024 0845)  Discharge to home or other facility with appropriate resources: Identify barriers to discharge with patient and caregiver

## 2024-09-04 NOTE — DISCHARGE INSTR - COC
Continuity of Care Form    Patient Name: Meche Bansal   :  1960  MRN:  [id number]    Admit date:  2024  Discharge date:  24    Code Status Order: Full Code   Advance Directives:   Advance Care Flowsheet Documentation             Admitting Physician:  Panfilo Pond MD  PCP: No primary care provider on file.    Discharging Nurse: CIRILO Gupta, ASHA  Discharging Hospital Unit/Room#: ARU-4906/4906-01  Discharging Unit Phone Number: [phone]    Emergency Contact:   Extended Emergency Contact Information  Primary Emergency Contact: Anthony Bansal  Home Phone: [phone]  Mobile Phone: [phone]  Relation: Spouse  Secondary Emergency Contact: Leona Salmeron  Home Phone: [phone]  Mobile Phone: [phone]  Relation: Child    Past Surgical History:  Past Surgical History:   Procedure Laterality Date    DILATATION, ESOPHAGUS      HYSTERECTOMY (CERVIX STATUS UNKNOWN)         Immunization History:     There is no immunization history on file for this patient.    Active Problems:  Patient Active Problem List   Diagnosis Code    Traumatic subdural hemorrhage with loss of consciousness of unspecified duration, initial encounter ([facility]) S06.5X9A    Skin rash R21    TBI (traumatic brain injury) ([facility]) S06.9XAA    ETOH abuse F10.10       Isolation/Infection:   Isolation            No Isolation          Patient Infection Status       None to display            Nurse Assessment:  Last Vital Signs: /78   Pulse 74   Temp 98.2 °F (36.8 °C) (Oral)   Resp 18   Ht 1.6 m (5' 2.99\")   Wt 53.5 kg (117 lb 14.4 oz)   SpO2 97%   BMI 20.89 kg/m²     Last documented pain score (0-10 scale): Pain Level: 6  Last Weight:   Wt Readings from Last 1 Encounters:   24 53.5 kg (117 lb 14.4 oz)     Mental Status:  alert and forgetful    IV Access:  - None    Nursing Mobility/ADLs: standby without device  Walking   Independent  Transfer  Independent  Bathing  Independent  Dressing  Independent  Toileting

## 2024-09-04 NOTE — CARE COORDINATION
Case Management -  Discharge Note      Patient Name: Meche Bansal                   YOB: 1960            Readmission Risk (Low < 19, Mod (19-27), High > 27): Readmission Risk Score: 8.9    Current PCP: No primary care provider on file.      (IMM) Important Message from Medicare:    Has pt received appropriate IMM before discharge if required: yes  Date: 09/04/2024    Patient/patient representative has been educated on the benefits of HHC as well as the possible risks of declining recommended services. Patient/patient representative has acknowledged the information provided and decided on the following discharge plan. Patient/ patient representative has been provided freedom of choice regarding service provider, supported by basic dialogue that supports the patient's individualized plan of care/goals.    [facility]  [address], [place] [address]   [phone]      Financial    Payor: [employer] / Plan: [employer] HEALTH PLANS / Product Type: *No Product type* /     Pharmacy:  Potential assistance Purchasing Medications: No  Meds-to-Beds request:        [facility] [id number] - [place], [place] - [address] -  [phone] - F [phone]  [address] [address]  Phone: [phone] Fax: [phone]      Notes:    Additional Case Management Notes:  Patient will discharge home w/[facility] (SW will check into authoization through insurance tomorrow).  Spouse will transport home and care for patient.  Spouse has informed on Adult Day Services and ETOH resources.  COA has contacted spouse and he will call back to discuss their services and possible assistance with Adult Day Services.  No other CM needs identified at this time.    Electronically signed by ANAHI Zabala LSW on 9/4/2024 at 7:11 PM

## 2024-09-04 NOTE — PROGRESS NOTES
complete simple IADL at modified independent - not met, supervision        Therapy Session Time     Individual Group Co-treatment   Time In 0730      Time Out 0830      Minutes 60         Timed Code Treatment Minutes: 60 minutes   Total Treatment Minutes:  60 minutes        Electronically Signed By: LOIS Junior OTR/L [id number]

## 2024-09-04 NOTE — DISCHARGE INSTRUCTIONS
We hope your stay on rehab has exceeded your expectations. Once again the entire Acute Rehab Staff at [facility] wish to thank you for allowing us the privilege to care for you.         A few days after you are discharged from Rehab, you will receive a survey (Pedro Luis Barraza) in the mail or through email.  This is a nationally distributed survey sent to thousands of rehab patients throughout the nation.              It is very important to everyone on the rehab unit that we receive feedback based on your experience.          Thank you, we wish you good health always,         Acute Rehab Team      Hospital Preference:     [facility]        Medical Diagnosis/Conditions    _______________________ (free text)    Emergency Contact:    ________________________________________Phone#________________________      Advanced Directives:    Code Status: ?  [x]  Full Code  ?  []  DNR  ? []  DNRCC  ? []  DNRCC - Arrest    (as of date of discharge:  _________)      Medical POA: ?  []   Yes ______________________________ ?  []   No                                       (Name and phone number)                     Living Will:   ?   []   Yes    ?  []   No        Insurance Information:    _______________________ (free text)      Individual Responsible  for the coordination of the discharge/follow up:    ______________________________________________________    Functional Status:    VISUAL DEFICITS:    Yes [x]  No  []       If yes, assisted device:   Wears Glasses Yes [x]  No  [] *reading   Wears Contacts  Yes []  No  []  Legally Blind Yes []  No  []    HEARING DEFICITS:    Yes []  No  [x]       If yes, assisted device:   Wears Hearing Aids Yes []  No  []  Pocket Talker  Yes []  No  []       Physical Therapist & Contact #:  Jelani Saleem PT, DPT - [phone]  OccupationalTherapist & Contact #: LOIS Junior OTR/L [phone]  Speech Therapist & Contact #: Mingo Waller MA, CCC-SLP

## 2024-09-05 VITALS
DIASTOLIC BLOOD PRESSURE: 84 MMHG | WEIGHT: 117.9 LBS | RESPIRATION RATE: 18 BRPM | HEIGHT: 63 IN | HEART RATE: 70 BPM | BODY MASS INDEX: 20.89 KG/M2 | OXYGEN SATURATION: 97 % | TEMPERATURE: 98.4 F | SYSTOLIC BLOOD PRESSURE: 129 MMHG

## 2024-09-05 PROCEDURE — 6370000000 HC RX 637 (ALT 250 FOR IP): Performed by: PHYSICAL MEDICINE & REHABILITATION

## 2024-09-05 PROCEDURE — 6370000000 HC RX 637 (ALT 250 FOR IP): Performed by: STUDENT IN AN ORGANIZED HEALTH CARE EDUCATION/TRAINING PROGRAM

## 2024-09-05 RX ORDER — PROPRANOLOL HYDROCHLORIDE 10 MG/1
20 TABLET ORAL 3 TIMES DAILY
Qty: 84 TABLET | Refills: 0 | Status: SHIPPED | OUTPATIENT
Start: 2024-09-05 | End: 2024-09-19

## 2024-09-05 RX ORDER — LANOLIN ALCOHOL/MO/W.PET/CERES
3 CREAM (GRAM) TOPICAL EVERY EVENING
Qty: 30 TABLET | Refills: 0 | Status: SHIPPED | OUTPATIENT
Start: 2024-09-05

## 2024-09-05 RX ORDER — MULTIVITAMIN WITH IRON
1 TABLET ORAL DAILY
Qty: 30 TABLET | Refills: 0 | Status: SHIPPED | OUTPATIENT
Start: 2024-09-06

## 2024-09-05 RX ORDER — PROPRANOLOL HYDROCHLORIDE 20 MG/1
20 TABLET ORAL 3 TIMES DAILY
Qty: 42 TABLET | Refills: 0 | Status: SHIPPED | OUTPATIENT
Start: 2024-09-05 | End: 2024-09-05

## 2024-09-05 RX ORDER — TRIAMCINOLONE ACETONIDE 1 MG/G
CREAM TOPICAL
Qty: 15 G | Refills: 0 | Status: SHIPPED | OUTPATIENT
Start: 2024-09-05 | End: 2024-09-11

## 2024-09-05 RX ADMIN — PROPRANOLOL HYDROCHLORIDE 20 MG: 20 TABLET ORAL at 09:08

## 2024-09-05 RX ADMIN — TRIAMCINOLONE ACETONIDE: 1 CREAM TOPICAL at 09:10

## 2024-09-05 RX ADMIN — ACETAMINOPHEN 325MG 650 MG: 325 TABLET ORAL at 09:07

## 2024-09-05 RX ADMIN — THERA TABS 1 TABLET: TAB at 09:08

## 2024-09-05 ASSESSMENT — PAIN SCALES - GENERAL
PAINLEVEL_OUTOF10: 7
PAINLEVEL_OUTOF10: 0

## 2024-09-05 ASSESSMENT — PAIN DESCRIPTION - LOCATION: LOCATION: LEG;BACK

## 2024-09-05 ASSESSMENT — PAIN DESCRIPTION - ORIENTATION: ORIENTATION: RIGHT;LEFT

## 2024-09-05 NOTE — PROGRESS NOTES
PT. Assessment complete. Pt. Lying in bed with  at bedside. Complains of mild tenderness to knees s/p fall on day shift. No swelling noted to either knee. Ice pack offered. Pt. Declined. No other complaints verbalized. All needs met. Bed exit alarm on.

## 2024-09-05 NOTE — PLAN OF CARE
Problem: Discharge Planning  Goal: Discharge to home or other facility with appropriate resources  9/5/2024 1011 by Buddy Juarez RN  Outcome: Completed     Problem: Safety - Adult  Goal: Free from fall injury  9/5/2024 1011 by Buddy Juarez RN  Outcome: Completed     Problem: Nutrition Deficit:  Goal: Optimize nutritional status  9/5/2024 1011 by Buddy Juarez RN  Outcome: Completed     Problem: Pain  Goal: Verbalizes/displays adequate comfort level or baseline comfort level  9/5/2024 1011 by Buddy Juarez RN  Outcome: Completed     Problem: Anxiety  Goal: Will report anxiety at manageable levels  Description: INTERVENTIONS:  1. Administer medication as ordered  2. Teach and rehearse alternative coping skills  3. Provide emotional support with 1:1 interaction with staff  9/5/2024 1011 by Buddy Juarez RN  Outcome: Completed     Problem: Confusion  Goal: Confusion, delirium, dementia, or psychosis is improved or at baseline  Description: INTERVENTIONS:  1. Assess for possible contributors to thought disturbance, including medications, impaired vision or hearing, underlying metabolic abnormalities, dehydration, psychiatric diagnoses, and notify attending LIP  2. [place] high risk fall precautions, as indicated  3. Provide frequent short contacts to provide reality reorientation, refocusing and direction  4. Decrease environmental stimuli, including noise as appropriate  5. Monitor and intervene to maintain adequate nutrition, hydration, elimination, sleep and activity  6. If unable to ensure safety without constant attention obtain sitter and review sitter guidelines with assigned personnel  7. Initiate Psychosocial CNS and Spiritual Care consult, as indicated  9/5/2024 1011 by Buddy Juarez RN  Outcome: Completed     Problem: Drug Abuse/Detox  Goal: Will have no detox symptoms and will verbalize plan for changing drug-related behavior  Description: INTERVENTIONS:  1. Administer medication as ordered  2. Monitor

## 2024-09-05 NOTE — CARE COORDINATION
09/05/24 1035   IMM Letter   IMM Letter given to Patient/Family/Significant other/Guardian/POA/by: Case Management gave within 4 hours of D/C   IMM Letter date given: 09/05/24   IMM Letter time given: 1004

## 2024-09-05 NOTE — PROGRESS NOTES
Patient and her  reported to RN that patient tripped over an electrical cord located in the corner of the room as she was trying to close the window shade. Patient states that she landed on her knees. Patient and  stated that she did not hit her head. Patient reports bilateral knee tenderness. No bruising or swelling noted on assessment, pt denies any other pain. Dr. Pond and hospitalist notified.

## 2024-09-05 NOTE — PLAN OF CARE
Problem: Discharge Planning  Goal: Discharge to home or other facility with appropriate resources  Outcome: Progressing     Problem: Safety - Adult  Goal: Free from fall injury  Outcome: Progressing     Problem: Nutrition Deficit:  Goal: Optimize nutritional status  Outcome: Progressing     Problem: Pain  Goal: Verbalizes/displays adequate comfort level or baseline comfort level  Outcome: Progressing     Problem: Skin/Tissue Integrity - Adult  Goal: Skin integrity remains intact  Outcome: Progressing     Problem: Anxiety  Goal: Will report anxiety at manageable levels  Description: INTERVENTIONS:  1. Administer medication as ordered  2. Teach and rehearse alternative coping skills  3. Provide emotional support with 1:1 interaction with staff  Outcome: Progressing     Problem: Confusion  Goal: Confusion, delirium, dementia, or psychosis is improved or at baseline  Description: INTERVENTIONS:  1. Assess for possible contributors to thought disturbance, including medications, impaired vision or hearing, underlying metabolic abnormalities, dehydration, psychiatric diagnoses, and notify attending LIP  2. [place] high risk fall precautions, as indicated  3. Provide frequent short contacts to provide reality reorientation, refocusing and direction  4. Decrease environmental stimuli, including noise as appropriate  5. Monitor and intervene to maintain adequate nutrition, hydration, elimination, sleep and activity  6. If unable to ensure safety without constant attention obtain sitter and review sitter guidelines with assigned personnel  7. Initiate Psychosocial CNS and Spiritual Care consult, as indicated  Outcome: Progressing     Problem: Drug Abuse/Detox  Goal: Will have no detox symptoms and will verbalize plan for changing drug-related behavior  Description: INTERVENTIONS:  1. Administer medication as ordered  2. Monitor physical status  3. Provide emotional support with 1:1 interaction with staff  4. Encourage

## 2024-09-05 NOTE — PROGRESS NOTES
CLINICAL PHARMACY NOTE: MEDS TO BEDS    Total # of Prescriptions Filled: 4   The following medications were delivered to the patient:  PROPRANOLOL HCL 10MG TABS  TRIAMCINOLONE ACETONIDE 0.1% CREA  DAILY -NANETTE TABS  MELATONIN 3MG TABS    Additional Documentation: Patient  picked up=signed  Randee Turnerls Pharmacy Tech

## 2024-09-05 NOTE — PROGRESS NOTES
Francine Monroe responded via secure message and stated she was not on site but did pass information that patient fell on to her attending, Dr. Praveen Corcoran, who is in the ED tonight and would hopefully come to see patient.

## 2024-09-05 NOTE — PROGRESS NOTES
Meche Bansal  9/4/2024  [id number]    Chief Complaint: Traumatic subdural hemorrhage with loss of consciousness of unspecified duration, initial encounter ([facility])    Subjective:   No acute events overnight.     Patient reports that she is doing well today. Having some bilateral leg \"aching\", \"cramping\" which was relieved with PRN Biofreeze. Her rash is doing about the same. She is tolerating higher dose of Benadryl. Denies any fevers/chills, headache, chest pain, dyspnea, abdominal pain. Long discussion with patient and  requiring need for alcohol cessation while recovering from TBI.     Last BM: Stool Occurrence: 0 (09/04/24 1521)    Objective:  Patient Vitals for the past 24 hrs:   BP Temp Temp src Pulse Resp SpO2   09/04/24 1825 130/78 98.2 °F (36.8 °C) Oral 74 18 97 %   09/04/24 0853 109/71 98.4 °F (36.9 °C) Oral 79 18 98 %     Gen: No distress, pleasant.   HEENT: Normocephalic, atraumatic.   CV: Regular rate and rhythm. Extremities warm, well perfused.   Resp: No respiratory distress. CTAB.  Abd: Soft, nontender  Ext: No edema.   Skin: Diffuse papular rash w/ excoriations involving back, stable from prior.   Neuro: Alert, oriented to person and place.    Laboratory data: Available via EMR.     Therapy progress:       PT    Supine to Sit: Independent  Sit to Supine: Independent   Sit to Stand: Independent  Chair/Bed to Chair Transfer: Independent  Car Transfer: Independent  Ambulation 10 ft: Independent  Ambulation 50 ft: Independent  Ambulation 150 ft: Independent  Stairs - 1 Step: Independent  Stairs - 4 Step: Independent  Stairs - 12 Step: Independent    OT    Eating: Independent  Oral Hygiene: Independent  Bathing: Independent  Upper Body Dressing: Independent  Lower Body Dressing: Independent  Toilet Transfer: Independent  Toilet Hygiene: Independent    Speech Therapy    Pt presents with severe cognitive linguistic deficits with most significant attention and memory impairments, in addition to deficits

## 2024-09-05 NOTE — PROGRESS NOTES
Pt. Fell on day shift and hit her knees. Day shift nurse notified day team but have not come to see patient yet. On call provider, Francine Braag -Chacorta notified via secure message. Awaiting response.

## 2024-09-05 NOTE — DISCHARGE SUMMARY
Physical Medicine & Rehabilitation  Discharge Summary     Patient Identification:  Meche Bansal  : 1960  Admit date: 2024  Discharge date: 2024  Attending provider: No att. providers found        Primary care provider: No primary care provider on file.     Discharge Diagnoses:   Patient Active Problem List   Diagnosis    Traumatic subdural hemorrhage with loss of consciousness of unspecified duration, initial encounter ([facility])    Skin rash    TBI (traumatic brain injury) ([facility])    ETOH abuse       Discharge Functional Status:      Physical therapy:  Supine to Sit: Independent  Sit to Supine: Independent      Sit to Stand: Independent  Chair/Bed to Chair Transfer: Independent  Car Transfer: Independent     Ambulation 10 ft: Independent  Ambulation 50 ft: Independent  Ambulation 150 ft: Independent  Stairs - 1 Step: Independent  Stairs - 4 Step: Independent  Stairs - 12 Step: Independent    Occupational therapy:  Eating: Independent  Oral Hygiene: Independent  Bathing: Independent  Upper Body Dressing: Independent  Lower Body Dressing: Independent     Toilet Transfer: Independent  Toilet Hygiene: Independent    Speech therapy:    Pt presents with severe cognitive linguistic deficits with most significant attention and memory impairments, in addition to deficits in orientation, problem solving, and judgement/ insight. Requires mod-max cues to redirect and attend to discussion on deficits due to limited insight. Her orientation to situation and basic temporal concepts has improved however working memory and attention deficits contribute to reduced safety awareness thus requiring frequent redirection for comprehension of safety parameters relative to her TBI.  Pt's attention and memory also impact verbal expression, thought organization, and pragmatic/ social functioning. Recommend 24 hour supervision upon discharge and ongoing skilled SLP intervention for return to prior level of function and to maximize

## 2024-09-05 NOTE — PROGRESS NOTES
The pt's  at bedside.  Reviewed and given AVS.  Answered all questions.  The pt discharged to home.  Prescriptions will be picked up by the pt's  at the out pt pharmacy.

## 2024-09-10 ENCOUNTER — CLINICAL DOCUMENTATION (OUTPATIENT)
Dept: PHYSICAL THERAPY | Age: 64
End: 2024-09-10